# Patient Record
Sex: FEMALE | Race: WHITE | Employment: FULL TIME | URBAN - METROPOLITAN AREA
[De-identification: names, ages, dates, MRNs, and addresses within clinical notes are randomized per-mention and may not be internally consistent; named-entity substitution may affect disease eponyms.]

---

## 2017-03-27 RX ORDER — VENLAFAXINE HYDROCHLORIDE 150 MG/1
CAPSULE, EXTENDED RELEASE ORAL
Qty: 90 CAPSULE | Refills: 0 | Status: SHIPPED | OUTPATIENT
Start: 2017-03-27 | End: 2017-04-06

## 2017-03-27 RX ORDER — VENLAFAXINE HYDROCHLORIDE 150 MG/1
CAPSULE, EXTENDED RELEASE ORAL
Qty: 90 CAPSULE | Refills: 0 | OUTPATIENT
Start: 2017-03-27

## 2017-03-30 ENCOUNTER — LAB ENCOUNTER (OUTPATIENT)
Dept: LAB | Age: 37
End: 2017-03-30
Attending: FAMILY MEDICINE
Payer: COMMERCIAL

## 2017-03-30 DIAGNOSIS — Z00.00 LABORATORY TESTS ORDERED AS PART OF A COMPLETE PHYSICAL EXAM (CPE): ICD-10-CM

## 2017-03-30 PROCEDURE — 81001 URINALYSIS AUTO W/SCOPE: CPT

## 2017-03-30 PROCEDURE — 80053 COMPREHEN METABOLIC PANEL: CPT

## 2017-03-30 PROCEDURE — 85025 COMPLETE CBC W/AUTO DIFF WBC: CPT

## 2017-03-30 PROCEDURE — 80061 LIPID PANEL: CPT

## 2017-03-30 PROCEDURE — 84443 ASSAY THYROID STIM HORMONE: CPT

## 2017-03-30 PROCEDURE — 36415 COLL VENOUS BLD VENIPUNCTURE: CPT

## 2017-04-06 ENCOUNTER — OFFICE VISIT (OUTPATIENT)
Dept: FAMILY MEDICINE CLINIC | Facility: CLINIC | Age: 37
End: 2017-04-06

## 2017-04-06 VITALS
DIASTOLIC BLOOD PRESSURE: 70 MMHG | HEIGHT: 63.5 IN | RESPIRATION RATE: 16 BRPM | SYSTOLIC BLOOD PRESSURE: 122 MMHG | BODY MASS INDEX: 24.46 KG/M2 | OXYGEN SATURATION: 99 % | WEIGHT: 139.75 LBS | TEMPERATURE: 98 F | HEART RATE: 86 BPM

## 2017-04-06 DIAGNOSIS — Z00.00 PHYSICAL EXAM, ANNUAL: Primary | ICD-10-CM

## 2017-04-06 PROCEDURE — 99395 PREV VISIT EST AGE 18-39: CPT | Performed by: FAMILY MEDICINE

## 2017-04-06 PROCEDURE — 88175 CYTOPATH C/V AUTO FLUID REDO: CPT | Performed by: FAMILY MEDICINE

## 2017-04-06 PROCEDURE — 87624 HPV HI-RISK TYP POOLED RSLT: CPT | Performed by: FAMILY MEDICINE

## 2017-04-06 RX ORDER — LEVONORGESTREL AND ETHINYL ESTRADIOL 0.1-0.02MG
1 KIT ORAL DAILY
Qty: 30 TABLET | Refills: 3 | Status: SHIPPED | OUTPATIENT
Start: 2017-04-06 | End: 2017-08-30

## 2017-04-06 NOTE — PATIENT INSTRUCTIONS
Healthy diet. Stay active. Start new birth control pills with start of the new pack. Call in 3 months with update. We can continue new birth control pills or we can switch it back to the current birth control pills if the new one would not work.

## 2017-04-06 NOTE — PROGRESS NOTES
HPI:   Mumtaz Richardson is a 39year old female who presents for a complete physical exam. Symptoms: denies discharge, itching, burning or dysuria. Patient complains of  irregular menses.  Sometimes  light sometimes normal.  Willing to try medication changed to pertinent past surgical history.    Family History   Problem Relation Age of Onset   • anxiety disorder[other] [OTHER] Mother    • hyperthyroidism[other] Jenifer Porch Mother    • Cancer Father      prostate ca   • Cancer Maternal Grandmother      colon ca, to laura no cyanosis, clubbing or edema  NEURO: Oriented times three,cranial nerves are intact,motor and sensory are grossly intact    Results for orders placed or performed in visit on 40[birth year]  -COMP METABOLIC PANEL (14)   Result Value Ref Range   Glucose 96 70-9 81.0-100.0 fL   MCH 28.6 27.0-33.2 pg   MCHC 32.8 31.0-37.0 g/dL   RDW 12.6 11.5-16.0 %   RDW-SD 40.1 35.1-46.3 fL   Neutrophil Absolute Prelim 2.40 1.30-6.70 x10 (3) uL   Neutrophil Absolute 2.40 1.30-6.70 x10(3) uL   Lymphocyte Absolute 2.19 0.90-4.00 x1

## 2017-06-26 RX ORDER — VENLAFAXINE HYDROCHLORIDE 150 MG/1
CAPSULE, EXTENDED RELEASE ORAL
Qty: 90 CAPSULE | Refills: 0 | Status: SHIPPED | OUTPATIENT
Start: 2017-06-26 | End: 2017-09-19

## 2017-08-25 RX ORDER — LEVONORGESTREL AND ETHINYL ESTRADIOL 0.1-0.02MG
1 KIT ORAL DAILY
Qty: 30 TABLET | Refills: 3 | Status: CANCELLED
Start: 2017-08-25

## 2017-08-31 RX ORDER — LEVONORGESTREL AND ETHINYL ESTRADIOL 0.1-0.02MG
1 KIT ORAL DAILY
Qty: 84 TABLET | Refills: 0 | Status: SHIPPED | OUTPATIENT
Start: 2017-08-31 | End: 2017-12-08

## 2017-09-21 RX ORDER — VENLAFAXINE HYDROCHLORIDE 150 MG/1
CAPSULE, EXTENDED RELEASE ORAL
Qty: 30 CAPSULE | Refills: 0 | Status: SHIPPED | OUTPATIENT
Start: 2017-09-21 | End: 2017-10-18

## 2017-10-20 RX ORDER — VENLAFAXINE HYDROCHLORIDE 150 MG/1
CAPSULE, EXTENDED RELEASE ORAL
Qty: 30 CAPSULE | Refills: 0 | Status: SHIPPED | OUTPATIENT
Start: 2017-10-20 | End: 2017-11-14

## 2017-11-15 RX ORDER — VENLAFAXINE HYDROCHLORIDE 150 MG/1
CAPSULE, EXTENDED RELEASE ORAL
Qty: 30 CAPSULE | Refills: 0 | Status: SHIPPED | OUTPATIENT
Start: 2017-11-15 | End: 2017-11-21

## 2017-11-15 NOTE — TELEPHONE ENCOUNTER
VENLAFAXINE ER 150MG CAPSULES    Next  appt is on 11/21/2017. Not a per protocol medication.     Rx is pending for your approval.    Please sign,    Thank you

## 2017-11-21 NOTE — PROGRESS NOTES
Cindy Fairchild is a 40year old female. cc depression, anxiety  HPI:   Patient is coming for follow-up of depression anxiety. She is taking venlafaxine doing well medication and helps her to stay stable.   No suicidal no homicidal.  No side effects of the med diagnosis)    No orders of the defined types were placed in this encounter. Meds & Refills for this Visit:  No prescriptions requested or ordered in this encounter  Continue current meds. Healthy diet. Stay active. .   Imaging & Consults:  None    T

## 2017-12-08 RX ORDER — LEVONORGESTREL AND ETHINYL ESTRADIOL 0.1-0.02MG
1 KIT ORAL DAILY
Qty: 84 TABLET | Refills: 0
Start: 2017-12-08 | End: 2017-12-12

## 2017-12-08 RX ORDER — VENLAFAXINE HYDROCHLORIDE 150 MG/1
150 CAPSULE, EXTENDED RELEASE ORAL
Qty: 90 CAPSULE | Refills: 0
Start: 2017-12-08 | End: 2017-12-12

## 2017-12-08 NOTE — TELEPHONE ENCOUNTER
From: Susana Jaramillo  Sent: 12/8/2017 8:53 AM CST  Subject: Medication Renewal Request    Hemalatha Sandoval.  Dimitri Dance would like a refill of the following medications:     VENLAFAXINE HCL  MG Oral Capsule SR 24 Hr Arie Briones MD]   Patient Comment: I have a

## 2018-06-07 RX ORDER — VENLAFAXINE HYDROCHLORIDE 150 MG/1
CAPSULE, EXTENDED RELEASE ORAL
Qty: 30 CAPSULE | Refills: 0 | Status: SHIPPED | OUTPATIENT
Start: 2018-06-07 | End: 2018-07-05

## 2018-06-07 NOTE — TELEPHONE ENCOUNTER
VENLAFAXINE ER 150MG CAPSULES    Not a per protocol medication. Rx is pending for your approval.    Please print & sign,    Thank you    A [employer] message was sent to  The pt advising her to call and schedule a med check.

## 2018-07-06 RX ORDER — VENLAFAXINE HYDROCHLORIDE 150 MG/1
CAPSULE, EXTENDED RELEASE ORAL
Qty: 30 CAPSULE | Refills: 0 | Status: SHIPPED | OUTPATIENT
Start: 2018-07-06 | End: 2018-08-02

## 2018-07-06 NOTE — TELEPHONE ENCOUNTER
VENLAFAXINE ER 150MG CAPSULES    A [employer] message was sent to the pt regarding scheduling   A med check. She was last seen on 11/21/2017.     Rx is pending for your approval.    Please print & sign,    Thank you

## 2018-08-01 RX ORDER — LEVONORGESTREL AND ETHINYL ESTRADIOL 0.1-0.02MG
1 KIT ORAL DAILY
Qty: 84 TABLET | Refills: 0 | Status: SHIPPED | OUTPATIENT
Start: 2018-08-01 | End: 2018-09-06

## 2018-08-01 RX ORDER — LEVONORGESTREL AND ETHINYL ESTRADIOL 0.1-0.02MG
1 KIT ORAL DAILY
Qty: 84 TABLET | Refills: 0 | OUTPATIENT
Start: 2018-08-01

## 2018-08-02 RX ORDER — VENLAFAXINE HYDROCHLORIDE 150 MG/1
CAPSULE, EXTENDED RELEASE ORAL
Qty: 30 CAPSULE | Refills: 0 | Status: SHIPPED | OUTPATIENT
Start: 2018-08-02 | End: 2018-09-06

## 2018-08-02 RX ORDER — LEVONORGESTREL AND ETHINYL ESTRADIOL 0.1-0.02MG
1 KIT ORAL DAILY
Qty: 84 TABLET | Refills: 0 | OUTPATIENT
Start: 2018-08-02

## 2018-08-02 NOTE — TELEPHONE ENCOUNTER
VENLAFAXINE ER 150MG CAPSULES    Not a per protocol medication. Rx is pending for your approval.    Please sign,     Thank you    The patient has an appt on 09/06/2018    Pt last refill was on 07/06/2018 for 30/0.

## 2018-09-06 ENCOUNTER — OFFICE VISIT (OUTPATIENT)
Dept: FAMILY MEDICINE CLINIC | Facility: CLINIC | Age: 38
End: 2018-09-06
Payer: COMMERCIAL

## 2018-09-06 VITALS
SYSTOLIC BLOOD PRESSURE: 116 MMHG | TEMPERATURE: 98 F | BODY MASS INDEX: 23.97 KG/M2 | WEIGHT: 137 LBS | HEIGHT: 63.5 IN | RESPIRATION RATE: 16 BRPM | DIASTOLIC BLOOD PRESSURE: 44 MMHG | HEART RATE: 74 BPM

## 2018-09-06 DIAGNOSIS — Z13.89 SCREENING FOR GENITOURINARY CONDITION: ICD-10-CM

## 2018-09-06 DIAGNOSIS — R10.9 ABDOMINAL BLOATING WITH CRAMPS: ICD-10-CM

## 2018-09-06 DIAGNOSIS — R14.0 ABDOMINAL BLOATING WITH CRAMPS: ICD-10-CM

## 2018-09-06 DIAGNOSIS — Z12.4 SCREENING FOR MALIGNANT NEOPLASM OF CERVIX: ICD-10-CM

## 2018-09-06 DIAGNOSIS — Z00.00 PHYSICAL EXAM, ANNUAL: Primary | ICD-10-CM

## 2018-09-06 DIAGNOSIS — Z00.00 LABORATORY EXAMINATION ORDERED AS PART OF A ROUTINE GENERAL MEDICAL EXAMINATION: ICD-10-CM

## 2018-09-06 PROCEDURE — 88175 CYTOPATH C/V AUTO FLUID REDO: CPT | Performed by: FAMILY MEDICINE

## 2018-09-06 PROCEDURE — 99395 PREV VISIT EST AGE 18-39: CPT | Performed by: FAMILY MEDICINE

## 2018-09-06 RX ORDER — VENLAFAXINE HYDROCHLORIDE 150 MG/1
150 CAPSULE, EXTENDED RELEASE ORAL
Qty: 90 CAPSULE | Refills: 1 | Status: SHIPPED | OUTPATIENT
Start: 2018-09-06 | End: 2019-02-19

## 2018-09-06 RX ORDER — LEVONORGESTREL AND ETHINYL ESTRADIOL 0.1-0.02MG
1 KIT ORAL DAILY
Qty: 84 TABLET | Refills: 3 | Status: SHIPPED | OUTPATIENT
Start: 2018-09-06 | End: 2019-10-24

## 2018-09-06 NOTE — PROGRESS NOTES
HPI:   Aruna Archibald is a 40year old female who presents for a complete physical exam. Symptoms: denies discharge, itching, burning or dysuria. Patient complains of having abdominal bloating and cramps.   There is family history of colon cancer in her grand state, unspecified    • Depression       No past surgical history on file.    Family History   Problem Relation Age of Onset   • anxiety disorder[other] [OTHER] Mother    • hyperthyroidism[other] Jhon Herb Mother    • Cancer Father      prostate ca   • Cancer adnexal masses or tenderness, PAP was done   RECTAL:normal.  MUSCULOSKELETAL: back is not tender,FROM of the back  EXTREMITIES: no cyanosis, clubbing or edema  NEURO: Oriented times three,cranial nerves are intact,motor and sensory are grossly intact    Re recommended to minimize false   negative results.  [FORMATTING REMOVED]      ASSESSMENT AND PLAN:   Aruna Archibald is a 40year old female who presents for a complete physical exam.   Physical exam, annual  (primary encounter diagnosis)  Laboratory examinatio

## 2019-02-19 NOTE — PROGRESS NOTES
Cody Sanchez is a 45year old female. c anxiety/depression   HPI:   Patient is coming to the office for follow-up of anxiety/depression. She is taking Effexor 150 mg for very long time. She notes recently that this medication makes her too numb.   She does Ht 63.5\"   Wt 139 lb   BMI 24.24 kg/m²   GENERAL: well developed, well nourished,in no apparent distress  SKIN: no rashes,no suspicious lesions  HEENT: atraumatic, normocephalic,ears and throat are clear  NECK: supple,no adenopathy  LUNGS: clear to auscu

## 2019-02-19 NOTE — PATIENT INSTRUCTIONS
Stop venlafaxine 150 mg daily. Start venlafaxine 75 mg daily. Keep good hydration. Healthy diet. Regular exercise.

## 2019-03-12 RX ORDER — VENLAFAXINE HYDROCHLORIDE 150 MG/1
CAPSULE, EXTENDED RELEASE ORAL
Qty: 90 CAPSULE | Refills: 0 | OUTPATIENT
Start: 2019-03-12

## 2019-05-01 ENCOUNTER — PATIENT MESSAGE (OUTPATIENT)
Dept: FAMILY MEDICINE CLINIC | Facility: CLINIC | Age: 39
End: 2019-05-01

## 2019-05-02 RX ORDER — VENLAFAXINE HYDROCHLORIDE 150 MG/1
150 CAPSULE, EXTENDED RELEASE ORAL
Qty: 90 CAPSULE | Refills: 0 | Status: SHIPPED | OUTPATIENT
Start: 2019-05-02 | End: 2020-06-10 | Stop reason: ALTCHOICE

## 2019-05-02 NOTE — TELEPHONE ENCOUNTER
From: Ariane Pereyra  To: Davina Kumar MD  Sent: 5/1/2019 6:05 PM CDT  Subject: Prescription Question    Hi Dr. Chrissie Vargas  Last appointment I talked to you about reducing my medicine from 150 mg to 75mg.  I did not like the way it made me feel at all

## 2019-05-22 RX ORDER — VENLAFAXINE HYDROCHLORIDE 75 MG/1
CAPSULE, EXTENDED RELEASE ORAL
Qty: 90 CAPSULE | Refills: 0 | Status: SHIPPED | OUTPATIENT
Start: 2019-05-22 | End: 2020-06-10 | Stop reason: ALTCHOICE

## 2019-05-22 NOTE — TELEPHONE ENCOUNTER
Last OV : 2/19/2019 Med f/u  Upcoming appt/reason:    Future Appointments   Date Time Provider Salome Christensen   6/13/2019  3:20 PM Mirtha Collins MD [facility]     Venlafaxine HCl ER 75 MG Oral Capsule SR 24 Hr 90 capsule 0 2/19/2019     Last

## 2019-06-13 PROBLEM — K92.89 GAS BLOAT SYNDROME: Status: ACTIVE | Noted: 2019-06-13

## 2019-06-13 PROBLEM — Z80.0 FAMILY HISTORY OF MALIGNANT NEOPLASM OF COLON: Status: ACTIVE | Noted: 2019-06-13

## 2019-06-13 PROBLEM — Z83.71 FAMILY HISTORY OF POLYPS IN THE COLON: Status: ACTIVE | Noted: 2019-06-13

## 2019-06-13 PROBLEM — Z83.719 FAMILY HISTORY OF POLYPS IN THE COLON: Status: ACTIVE | Noted: 2019-06-13

## 2019-10-24 RX ORDER — LEVONORGESTREL AND ETHINYL ESTRADIOL 0.1-0.02MG
KIT ORAL
Qty: 84 TABLET | Refills: 3 | Status: SHIPPED | OUTPATIENT
Start: 2019-10-24 | End: 2020-06-10

## 2020-01-23 PROBLEM — Z80.0 FAMILY HISTORY OF MALIGNANT NEOPLASM OF DIGESTIVE ORGAN: Status: ACTIVE | Noted: 2020-01-23

## 2020-01-23 PROBLEM — Z83.71 FAMILY HISTORY OF COLONIC POLYPS: Status: ACTIVE | Noted: 2020-01-23

## 2020-01-23 PROBLEM — Z12.11 SPECIAL SCREENING FOR MALIGNANT NEOPLASM OF COLON: Status: ACTIVE | Noted: 2020-01-23

## 2020-01-23 PROBLEM — Z83.719 FAMILY HISTORY OF COLONIC POLYPS: Status: ACTIVE | Noted: 2020-01-23

## 2020-04-13 ENCOUNTER — VIRTUAL PHONE E/M (OUTPATIENT)
Dept: FAMILY MEDICINE CLINIC | Facility: CLINIC | Age: 40
End: 2020-04-13
Payer: COMMERCIAL

## 2020-04-13 DIAGNOSIS — R07.89 CHEST TIGHTNESS: ICD-10-CM

## 2020-04-13 DIAGNOSIS — J30.81 ALLERGY TO CATS: Primary | ICD-10-CM

## 2020-04-13 PROCEDURE — 99213 OFFICE O/P EST LOW 20 MIN: CPT | Performed by: FAMILY MEDICINE

## 2020-04-13 RX ORDER — ALBUTEROL SULFATE 90 UG/1
2 AEROSOL, METERED RESPIRATORY (INHALATION) EVERY 4 HOURS PRN
Qty: 1 INHALER | Refills: 0 | Status: SHIPPED | OUTPATIENT
Start: 2020-04-13 | End: 2020-04-26

## 2020-04-13 NOTE — PATIENT INSTRUCTIONS
Try albuterol inhaler 2 puffs every 4-6 hours as needed for chest tightness. Flonase 2 sprays nostril once a day daily. Try Allegra 180 mg 1 tablet daily this is antihistamine.   If there is no improvement in your symptoms consider seeing allergist.

## 2020-04-13 NOTE — PROGRESS NOTES
Virtual/Telephone Check-In    Mana Rodrigues verbally consents to a [employer] on 04/13/20. Patient understands and accepts financial responsibility for any deductible, co-insurance and/or co-pays associated with this service.  This vi OU BID  2     Patient Active Problem List:     Anxiety     Depressive disorder     Family history of malignant neoplasm of colon     Family history of polyps in the colon     Gas bloat syndrome     Special screening for malignant neoplasm of colon     Fami

## 2020-04-26 DIAGNOSIS — R07.89 CHEST TIGHTNESS: ICD-10-CM

## 2020-04-26 RX ORDER — ALBUTEROL SULFATE 90 UG/1
AEROSOL, METERED RESPIRATORY (INHALATION)
Qty: 8.5 G | Refills: 0 | Status: SHIPPED | OUTPATIENT
Start: 2020-04-26 | End: 2021-05-17

## 2020-06-10 ENCOUNTER — OFFICE VISIT (OUTPATIENT)
Dept: FAMILY MEDICINE CLINIC | Facility: CLINIC | Age: 40
End: 2020-06-10
Payer: COMMERCIAL

## 2020-06-10 VITALS
WEIGHT: 140 LBS | TEMPERATURE: 99 F | RESPIRATION RATE: 16 BRPM | SYSTOLIC BLOOD PRESSURE: 102 MMHG | HEART RATE: 74 BPM | DIASTOLIC BLOOD PRESSURE: 72 MMHG | BODY MASS INDEX: 24.5 KG/M2 | HEIGHT: 63.5 IN | OXYGEN SATURATION: 98 %

## 2020-06-10 DIAGNOSIS — Z12.4 SCREENING FOR MALIGNANT NEOPLASM OF CERVIX: ICD-10-CM

## 2020-06-10 DIAGNOSIS — Z00.00 PHYSICAL EXAM, ANNUAL: Primary | ICD-10-CM

## 2020-06-10 DIAGNOSIS — Z13.89 SCREENING FOR GENITOURINARY CONDITION: ICD-10-CM

## 2020-06-10 DIAGNOSIS — Z12.31 ENCOUNTER FOR SCREENING MAMMOGRAM FOR MALIGNANT NEOPLASM OF BREAST: ICD-10-CM

## 2020-06-10 DIAGNOSIS — Z00.00 LABORATORY EXAMINATION ORDERED AS PART OF A ROUTINE GENERAL MEDICAL EXAMINATION: ICD-10-CM

## 2020-06-10 PROCEDURE — 99395 PREV VISIT EST AGE 18-39: CPT | Performed by: FAMILY MEDICINE

## 2020-06-10 PROCEDURE — 88175 CYTOPATH C/V AUTO FLUID REDO: CPT | Performed by: FAMILY MEDICINE

## 2020-06-10 PROCEDURE — 87624 HPV HI-RISK TYP POOLED RSLT: CPT | Performed by: FAMILY MEDICINE

## 2020-06-10 RX ORDER — LEVONORGESTREL AND ETHINYL ESTRADIOL 0.1-0.02MG
1 KIT ORAL DAILY
Qty: 84 TABLET | Refills: 3 | Status: SHIPPED | OUTPATIENT
Start: 2020-06-10 | End: 2021-03-04

## 2020-06-10 NOTE — PROGRESS NOTES
HPI:   Ariane Pereyra is a 44year old female who presents for a complete physical exam. Symptoms: denies discharge, itching, burning or dysuria. Patient has no complains.       Immunization History  Administered            Date(s) Administered    Influenza state, unspecified    • Bloating    • Depression    • Diarrhea, unspecified    • Wears glasses       History reviewed. No pertinent surgical history.    Family History   Problem Relation Age of Onset   • Colon Polyps Mother    • Other (anxiety disorder) Mot throat are clear  EYES:PERRLA, EOMI, ,conjunctiva are clear  NECK: supple,no adenopathy  CHEST: no chest tenderness  BREAST: no dominant or suspicious mass  LUNGS: clear to auscultation  CARDIO: RRR without murmur  GI: good BS's,no masses, HSM or tendernes plan.  The patient is asked to return for CPX in 1 year.

## 2020-06-18 ENCOUNTER — LAB ENCOUNTER (OUTPATIENT)
Dept: LAB | Age: 40
End: 2020-06-18
Attending: FAMILY MEDICINE
Payer: COMMERCIAL

## 2020-06-18 DIAGNOSIS — Z13.89 SCREENING FOR GENITOURINARY CONDITION: ICD-10-CM

## 2020-06-18 DIAGNOSIS — Z00.00 LABORATORY EXAMINATION ORDERED AS PART OF A ROUTINE GENERAL MEDICAL EXAMINATION: ICD-10-CM

## 2020-06-18 PROCEDURE — 84443 ASSAY THYROID STIM HORMONE: CPT

## 2020-06-18 PROCEDURE — 85025 COMPLETE CBC W/AUTO DIFF WBC: CPT

## 2020-06-18 PROCEDURE — 80053 COMPREHEN METABOLIC PANEL: CPT

## 2020-06-18 PROCEDURE — 36415 COLL VENOUS BLD VENIPUNCTURE: CPT

## 2020-06-18 PROCEDURE — 81003 URINALYSIS AUTO W/O SCOPE: CPT

## 2020-06-18 PROCEDURE — 80061 LIPID PANEL: CPT

## 2020-11-12 ENCOUNTER — HOSPITAL ENCOUNTER (OUTPATIENT)
Dept: MAMMOGRAPHY | Age: 40
Discharge: HOME | End: 2020-11-12
Attending: FAMILY MEDICINE
Payer: COMMERCIAL

## 2020-11-12 DIAGNOSIS — Z12.31 ENCOUNTER FOR SCREENING MAMMOGRAM FOR MALIGNANT NEOPLASM OF BREAST: ICD-10-CM

## 2020-11-12 PROCEDURE — 77063 BREAST TOMOSYNTHESIS BI: CPT | Performed by: FAMILY MEDICINE

## 2020-11-12 PROCEDURE — 77067 SCR MAMMO BI INCL CAD: CPT | Performed by: FAMILY MEDICINE

## 2021-03-05 RX ORDER — LEVONORGESTREL AND ETHINYL ESTRADIOL 0.1-0.02MG
1 KIT ORAL DAILY
Qty: 84 TABLET | Refills: 0 | Status: SHIPPED | OUTPATIENT
Start: 2021-03-05 | End: 2021-05-17

## 2021-05-17 ENCOUNTER — OFFICE VISIT (OUTPATIENT)
Dept: FAMILY MEDICINE CLINIC | Facility: CLINIC | Age: 41
End: 2021-05-17

## 2021-05-17 ENCOUNTER — LAB ENCOUNTER (OUTPATIENT)
Dept: LAB | Age: 41
End: 2021-05-17
Attending: FAMILY MEDICINE
Payer: COMMERCIAL

## 2021-05-17 VITALS
DIASTOLIC BLOOD PRESSURE: 80 MMHG | BODY MASS INDEX: 23.62 KG/M2 | HEIGHT: 63.5 IN | RESPIRATION RATE: 16 BRPM | HEART RATE: 78 BPM | WEIGHT: 135 LBS | SYSTOLIC BLOOD PRESSURE: 130 MMHG

## 2021-05-17 DIAGNOSIS — R79.89 ABNORMAL CBC: ICD-10-CM

## 2021-05-17 DIAGNOSIS — Z12.31 ENCOUNTER FOR SCREENING MAMMOGRAM FOR BREAST CANCER: ICD-10-CM

## 2021-05-17 DIAGNOSIS — Z12.4 SCREENING FOR CERVICAL CANCER: ICD-10-CM

## 2021-05-17 DIAGNOSIS — Z13.89 SCREENING FOR GENITOURINARY CONDITION: ICD-10-CM

## 2021-05-17 DIAGNOSIS — Z00.00 LABORATORY EXAMINATION ORDERED AS PART OF A ROUTINE GENERAL MEDICAL EXAMINATION: ICD-10-CM

## 2021-05-17 DIAGNOSIS — M79.671 RIGHT FOOT PAIN: ICD-10-CM

## 2021-05-17 DIAGNOSIS — Z00.00 PHYSICAL EXAM, ANNUAL: Primary | ICD-10-CM

## 2021-05-17 PROCEDURE — 3075F SYST BP GE 130 - 139MM HG: CPT | Performed by: FAMILY MEDICINE

## 2021-05-17 PROCEDURE — 88175 CYTOPATH C/V AUTO FLUID REDO: CPT | Performed by: FAMILY MEDICINE

## 2021-05-17 PROCEDURE — 87624 HPV HI-RISK TYP POOLED RSLT: CPT | Performed by: FAMILY MEDICINE

## 2021-05-17 PROCEDURE — 3008F BODY MASS INDEX DOCD: CPT | Performed by: FAMILY MEDICINE

## 2021-05-17 PROCEDURE — 84443 ASSAY THYROID STIM HORMONE: CPT

## 2021-05-17 PROCEDURE — 85025 COMPLETE CBC W/AUTO DIFF WBC: CPT

## 2021-05-17 PROCEDURE — G0438 PPPS, INITIAL VISIT: HCPCS | Performed by: FAMILY MEDICINE

## 2021-05-17 PROCEDURE — 3079F DIAST BP 80-89 MM HG: CPT | Performed by: FAMILY MEDICINE

## 2021-05-17 PROCEDURE — 80053 COMPREHEN METABOLIC PANEL: CPT

## 2021-05-17 PROCEDURE — 80061 LIPID PANEL: CPT

## 2021-05-17 PROCEDURE — 81001 URINALYSIS AUTO W/SCOPE: CPT

## 2021-05-17 PROCEDURE — 99396 PREV VISIT EST AGE 40-64: CPT | Performed by: FAMILY MEDICINE

## 2021-05-17 PROCEDURE — 36415 COLL VENOUS BLD VENIPUNCTURE: CPT

## 2021-05-17 RX ORDER — ARIPIPRAZOLE 2 MG/1
2 TABLET ORAL DAILY
COMMUNITY

## 2021-05-17 RX ORDER — LEVONORGESTREL AND ETHINYL ESTRADIOL 0.1-0.02MG
1 KIT ORAL DAILY
Qty: 84 TABLET | Refills: 3 | Status: SHIPPED | OUTPATIENT
Start: 2021-05-17

## 2021-05-17 RX ORDER — DULOXETIN HYDROCHLORIDE 60 MG/1
60 CAPSULE, DELAYED RELEASE ORAL DAILY
COMMUNITY
Start: 2021-05-04

## 2021-05-17 NOTE — PROGRESS NOTES
HPI:   Sinai Dumont is a 36year old female who presents for a complete physical exam. Symptoms: denies discharge, itching, burning or dysuria. Patient has been complaining of having pain in the right foot. Had injury of the right foot. Sustained a fall. Oral Cap DR Particles 60 mg daily. • ARIPiprazole 2 MG Oral Tab Take 2 mg by mouth daily. • Levonorgestrel-Ethinyl Estrad (VIENVA) 0.1-20 MG-MCG Oral Tab Take 1 tablet by mouth daily.  84 tablet 3      Past Medical History:   Diagnosis Date   • Julio Ht 5' 3.5\" (1.613 m)   Wt 135 lb (61.2 kg)   LMP 05/10/2021 (Exact Date)   BMI 23.54 kg/m²   Body mass index is 23.54 kg/m².    GENERAL: well developed, well nourished,in no apparent distress  SKIN: no rashes,no suspicious lesions  HEENT: atraumatic, nor Pap and pelvic done. Order put in for mammogram. Self breast exam explained. Health maintenance, will check fasting Lipids, CMP, and CBC. Pt will be at 48  referred for screening colonoscopy. Pt' s weight is Body mass index is 23.54 kg/m². , recommended low

## 2021-05-17 NOTE — PATIENT INSTRUCTIONS
Healthy diet. Stay active. Call [phone] to schedule Mammogram in November. Return for tetanus shot June/ July of this year.-Schedule appointment with the nurse.

## 2021-09-03 ENCOUNTER — TELEPHONE (OUTPATIENT)
Dept: ORTHOPEDICS CLINIC | Facility: CLINIC | Age: 41
End: 2021-09-03

## 2021-09-03 DIAGNOSIS — M79.671 RIGHT FOOT PAIN: Primary | ICD-10-CM

## 2021-09-03 NOTE — TELEPHONE ENCOUNTER
Requisite for xray order  Reviewed patients chart, xray orders are required. Order placed for right foot xrays    • Spoke to patient informed to arrive 30 mins prior to patients appt to complete x-ray order. Patient verbalized understanding and agreement.

## 2021-09-28 ENCOUNTER — OFFICE VISIT (OUTPATIENT)
Dept: ORTHOPEDICS CLINIC | Facility: CLINIC | Age: 41
End: 2021-09-28
Payer: COMMERCIAL

## 2021-09-28 ENCOUNTER — HOSPITAL ENCOUNTER (OUTPATIENT)
Dept: GENERAL RADIOLOGY | Age: 41
Discharge: HOME | End: 2021-09-28
Attending: PODIATRIST
Payer: COMMERCIAL

## 2021-09-28 VITALS — OXYGEN SATURATION: 99 % | HEART RATE: 72 BPM

## 2021-09-28 DIAGNOSIS — M79.671 RIGHT FOOT PAIN: ICD-10-CM

## 2021-09-28 DIAGNOSIS — M21.619 BUNION: ICD-10-CM

## 2021-09-28 DIAGNOSIS — M25.571 SINUS TARSI SYNDROME OF RIGHT FOOT: ICD-10-CM

## 2021-09-28 DIAGNOSIS — T14.8XXA AVULSION FRACTURE: Primary | ICD-10-CM

## 2021-09-28 PROCEDURE — 99203 OFFICE O/P NEW LOW 30 MIN: CPT | Performed by: PODIATRIST

## 2021-09-28 PROCEDURE — 73630 X-RAY EXAM OF FOOT: CPT | Performed by: PODIATRIST

## 2021-09-28 PROCEDURE — 96372 THER/PROPH/DIAG INJ SC/IM: CPT | Performed by: PODIATRIST

## 2021-09-28 RX ORDER — BETAMETHASONE SODIUM PHOSPHATE AND BETAMETHASONE ACETATE 3; 3 MG/ML; MG/ML
2.5 INJECTION, SUSPENSION INTRA-ARTICULAR; INTRALESIONAL; INTRAMUSCULAR; SOFT TISSUE ONCE
Status: COMPLETED | OUTPATIENT
Start: 2021-09-28 | End: 2021-09-28

## 2021-09-28 RX ADMIN — BETAMETHASONE SODIUM PHOSPHATE AND BETAMETHASONE ACETATE 2.4 MG: 3; 3 INJECTION, SUSPENSION INTRA-ARTICULAR; INTRALESIONAL; INTRAMUSCULAR; SOFT TISSUE at 09:28:00

## 2021-09-28 NOTE — PROGRESS NOTES
[facility] New Patient Note    CC: Patient presents with:   Foot Pain: right foot pain , discuss injection       HPI: The patient is a 36year old female who presents today with complaints of outside of right foot pain with certain activity    R Pulse 72   LMP 05/10/2021 (Exact Date)   SpO2 99%   neurovascular status is intact distally. Right foot-tender in the sinus tarsi region and anterior beak of the calcaneus. No swelling is present.   Mild irritation with range of motion of the subtalar j

## 2021-12-31 ENCOUNTER — PATIENT MESSAGE (OUTPATIENT)
Dept: FAMILY MEDICINE CLINIC | Facility: CLINIC | Age: 41
End: 2021-12-31

## 2022-01-03 NOTE — TELEPHONE ENCOUNTER
Pt requesting referral be placed for [facility] for Dr. Erwin Franco at [facility]. Pt states has not discussed this with Dr Jennfier Khalil yet, but has upcoming apt on 1/6/22 at 9:00 AM and needs referral asap.     Pt will  referral does not have fax numb

## 2022-01-03 NOTE — TELEPHONE ENCOUNTER
Call to pt-sts she has appt 1/6/21 w bettie dixon/therapist for anxiety. Has not discussed w dr Zaid Calderon. Advised dr Zaid Calderon is out of ofc-may be back 1/5. Will route for her review and call back w her input.      Pt sts \"want referral done soon

## 2022-01-04 NOTE — TELEPHONE ENCOUNTER
[phone] Call from pt-advised of claudine hansen to place referral order. Advised will need input from [facility] referrals to place order. Will call back asap re completion of referral order.  Patient voices understanding/agrees with plan/no further questions

## 2022-01-05 ENCOUNTER — PATIENT MESSAGE (OUTPATIENT)
Dept: FAMILY MEDICINE CLINIC | Facility: CLINIC | Age: 42
End: 2022-01-05

## 2022-01-05 NOTE — TELEPHONE ENCOUNTER
I have signed referral for patient I change it to psychologist since Alpa Hamm is a therapist.  Please check with Tiffany Hutson. I am not sure if she needs even referral from us since she has [facility]. Thanks.

## 2022-01-05 NOTE — TELEPHONE ENCOUNTER
Call to pt's home reaches identified voice mail. Per hipaa consent, left vmm advising dr Sanchez Rodriguez is back in ofc today, signed referral order and also provides reminder to confirm w insurance that [employer] is covered by her plan.  Advised printed

## 2022-01-05 NOTE — TELEPHONE ENCOUNTER
Spoke jhonny ramírez/[facility] referrals-she utilized NPI # for [email or web address] [email or web address] to run it through [facility]. sts this individual does not come up on provider search which means either this provider is not in network or is not in their database.      Huong ramírez pt report

## 2022-01-06 NOTE — TELEPHONE ENCOUNTER
Call to pt-advised we have no capability to place a mental health referral order specifically for a LCSW.  Discussed LCSWs usually work under [employer] of a pshychologist or psychiatrist, which is why we detailed name of provider and her title in the ref

## 2022-01-12 ENCOUNTER — HOSPITAL ENCOUNTER (OUTPATIENT)
Dept: MAMMOGRAPHY | Age: 42
Discharge: HOME | End: 2022-01-12
Attending: FAMILY MEDICINE
Payer: COMMERCIAL

## 2022-01-12 DIAGNOSIS — Z12.31 ENCOUNTER FOR SCREENING MAMMOGRAM FOR BREAST CANCER: ICD-10-CM

## 2022-01-12 PROCEDURE — 77063 BREAST TOMOSYNTHESIS BI: CPT | Performed by: FAMILY MEDICINE

## 2022-01-12 PROCEDURE — 77067 SCR MAMMO BI INCL CAD: CPT | Performed by: FAMILY MEDICINE

## 2022-01-20 ENCOUNTER — PATIENT MESSAGE (OUTPATIENT)
Dept: FAMILY MEDICINE CLINIC | Facility: CLINIC | Age: 42
End: 2022-01-20

## 2022-01-20 DIAGNOSIS — R92.2 DENSE BREAST TISSUE: Primary | ICD-10-CM

## 2022-01-24 NOTE — TELEPHONE ENCOUNTER
From: Edel Franks  To: Chloe Bhat MD  Sent: 1/20/2022 3:38 PM CST  Subject: Mammogram f/up. Hello  I received the attached email. Both of these tests are covered by my insurance as long as at a preferred lab. How do I get this scheduled?

## 2022-02-17 RX ORDER — LEVONORGESTREL AND ETHINYL ESTRADIOL 0.1-0.02MG
KIT ORAL
Qty: 84 TABLET | Refills: 0 | Status: SHIPPED | OUTPATIENT
Start: 2022-02-17

## 2022-02-17 NOTE — TELEPHONE ENCOUNTER
LOV: 5/17/21 (CPX)  Last Refill: 5/17/21, #84, 3 RF  Next OV: N/A    Protocol passed.    [facility] sent to pt to schedule annual.

## 2022-03-04 ENCOUNTER — HOSPITAL ENCOUNTER (OUTPATIENT)
Dept: ULTRASOUND IMAGING | Age: 42
Discharge: HOME | End: 2022-03-04
Attending: FAMILY MEDICINE
Payer: COMMERCIAL

## 2022-03-04 DIAGNOSIS — R92.2 DENSE BREAST TISSUE: ICD-10-CM

## 2022-03-04 PROCEDURE — 76641 ULTRASOUND BREAST COMPLETE: CPT | Performed by: FAMILY MEDICINE

## 2022-06-15 ENCOUNTER — OFFICE VISIT (OUTPATIENT)
Dept: ORTHOPEDICS CLINIC | Facility: CLINIC | Age: 42
End: 2022-06-15
Payer: COMMERCIAL

## 2022-06-15 VITALS — HEART RATE: 84 BPM | OXYGEN SATURATION: 99 %

## 2022-06-15 DIAGNOSIS — M25.571 SINUS TARSI SYNDROME OF RIGHT FOOT: Primary | ICD-10-CM

## 2022-06-15 PROCEDURE — 99213 OFFICE O/P EST LOW 20 MIN: CPT | Performed by: PODIATRIST

## 2022-06-15 PROCEDURE — 20600 DRAIN/INJ JOINT/BURSA W/O US: CPT | Performed by: PODIATRIST

## 2022-06-15 RX ORDER — BETAMETHASONE SODIUM PHOSPHATE AND BETAMETHASONE ACETATE 3; 3 MG/ML; MG/ML
4 INJECTION, SUSPENSION INTRA-ARTICULAR; INTRALESIONAL; INTRAMUSCULAR; SOFT TISSUE ONCE
Status: COMPLETED | OUTPATIENT
Start: 2022-06-15 | End: 2022-06-15

## 2022-06-15 RX ADMIN — BETAMETHASONE SODIUM PHOSPHATE AND BETAMETHASONE ACETATE 4.2 MG: 3; 3 INJECTION, SUSPENSION INTRA-ARTICULAR; INTRALESIONAL; INTRAMUSCULAR; SOFT TISSUE at 05:17:00

## 2022-06-15 NOTE — PROGRESS NOTES
[facility] Progress Note    Subjective:     Yesika Valencia Is here for follow-up of her right foot. She has had 2 injections of the sinus tarsi in the past about 1 year apart and it has helped until recently she started to get pain again. She thinks running aggravates it although she only runs 2 miles about 5 days a week        Objective:     Exam- tenderness about the sinus tarsi no swelling of the right foot noted    No pain with range of motion of the subtalar joint. Review of the x-ray shows the fracture fragment that never healed at the anterior beak of the calcaneus and this is right where she has the pain. Assessment/Plan:     Diagnoses and all orders for this visit:    Sinus tarsi syndrome of right foot  -     betamethasone sodium phosphate & acetate (CELESTONE) 6 (3-3) MG/ML injection 4.2 mg  -     DRAIN/INJECT SMALL JOINT/BURSA        She always could consider excision of that fracture fragment that never healed because that probably is part of the issue and the reason for the sinus tarsi pain. I did recommend stable shoes for running and running on a flat surface. Also consider orthotics. We did go ahead with another injection today. Risks and benefits discussed. Sterile prep of affected right foot . Injection of .5cc of betamethasone phosphate to painful area at sinus tarsi  She tolerated the injection well. Icing and rest of the foot. Follow-up as needed. Clarke Mario. Wesley Macedo DPM  [facility]    [employer]us speech recognition software was used to prepare this note. If a word or phrase is confusing, it is likely do to a failure of recognition. Please contact me with any questions or clarifications.

## 2022-08-03 RX ORDER — LEVONORGESTREL AND ETHINYL ESTRADIOL 0.1-0.02MG
KIT ORAL
Qty: 84 TABLET | Refills: 0 | Status: SHIPPED | OUTPATIENT
Start: 2022-08-03

## 2022-08-03 NOTE — TELEPHONE ENCOUNTER
LOV: 05/17/2021  For: physical exam   Patient advised to RTC on:   The patient is asked to return for CPX in 1 year    NOV:08/17/2022    Medication Quantity Refills Start End   LESSINA 0.1-20 MG-MCG Oral Tab 84 tablet 0 2/17/2022    Sig: Hamm Root 1 TABLET BY MOUTH DAILY       Gynecology Medication Protocol Passed 08/03/2022 06:52 AM    PASS--PENDING LAST PAP WNL--VIA MANUAL LOOKUP    Mammogram in past 12 months    Physical or Pelvic/Breast in past 12 or next 3 mos--VIA MANUAL LOOKUP

## 2022-08-17 ENCOUNTER — OFFICE VISIT (OUTPATIENT)
Dept: FAMILY MEDICINE CLINIC | Facility: CLINIC | Age: 42
End: 2022-08-17
Payer: COMMERCIAL

## 2022-08-17 ENCOUNTER — LAB ENCOUNTER (OUTPATIENT)
Dept: LAB | Age: 42
End: 2022-08-17
Attending: FAMILY MEDICINE
Payer: COMMERCIAL

## 2022-08-17 VITALS
HEART RATE: 74 BPM | RESPIRATION RATE: 14 BRPM | SYSTOLIC BLOOD PRESSURE: 120 MMHG | WEIGHT: 131 LBS | HEIGHT: 63.5 IN | BODY MASS INDEX: 22.92 KG/M2 | TEMPERATURE: 98 F | DIASTOLIC BLOOD PRESSURE: 82 MMHG

## 2022-08-17 DIAGNOSIS — D22.9 MULTIPLE NEVI: ICD-10-CM

## 2022-08-17 DIAGNOSIS — Z00.00 LABORATORY EXAMINATION ORDERED AS PART OF A ROUTINE GENERAL MEDICAL EXAMINATION: ICD-10-CM

## 2022-08-17 DIAGNOSIS — Z12.31 SCREENING MAMMOGRAM FOR BREAST CANCER: ICD-10-CM

## 2022-08-17 DIAGNOSIS — Z23 NEED FOR TDAP VACCINATION: ICD-10-CM

## 2022-08-17 DIAGNOSIS — Z00.00 PHYSICAL EXAM, ANNUAL: ICD-10-CM

## 2022-08-17 DIAGNOSIS — Z00.00 PHYSICAL EXAM, ANNUAL: Primary | ICD-10-CM

## 2022-08-17 DIAGNOSIS — Z13.89 SCREENING FOR GENITOURINARY CONDITION: ICD-10-CM

## 2022-08-17 DIAGNOSIS — Z12.4 ENCOUNTER FOR SCREENING FOR CERVICAL CANCER: ICD-10-CM

## 2022-08-17 LAB
ALBUMIN SERPL-MCNC: 3.7 G/DL (ref 3.4–5)
ALBUMIN/GLOB SERPL: 1 {RATIO} (ref 1–2)
ALP LIVER SERPL-CCNC: 42 U/L
ALT SERPL-CCNC: 26 U/L
ANION GAP SERPL CALC-SCNC: 3 MMOL/L (ref 0–18)
AST SERPL-CCNC: 20 U/L (ref 15–37)
BASOPHILS # BLD AUTO: 0.05 X10(3) UL (ref 0–0.2)
BASOPHILS NFR BLD AUTO: 1.3 %
BILIRUB SERPL-MCNC: 0.3 MG/DL (ref 0.1–2)
BILIRUB UR QL: NEGATIVE
BUN BLD-MCNC: 10 MG/DL (ref 7–18)
BUN/CREAT SERPL: 12.8 (ref 10–20)
CALCIUM BLD-MCNC: 9 MG/DL (ref 8.5–10.1)
CHLORIDE SERPL-SCNC: 107 MMOL/L (ref 98–112)
CHOLEST SERPL-MCNC: 216 MG/DL (ref ?–200)
CLARITY UR: CLEAR
CO2 SERPL-SCNC: 28 MMOL/L (ref 21–32)
COLOR UR: YELLOW
CREAT BLD-MCNC: 0.78 MG/DL
DEPRECATED RDW RBC AUTO: 39.5 FL (ref 35.1–46.3)
EOSINOPHIL # BLD AUTO: 0.03 X10(3) UL (ref 0–0.7)
EOSINOPHIL NFR BLD AUTO: 0.8 %
ERYTHROCYTE [DISTWIDTH] IN BLOOD BY AUTOMATED COUNT: 12.3 % (ref 11–15)
FASTING PATIENT LIPID ANSWER: YES
FASTING STATUS PATIENT QL REPORTED: YES
GFR SERPLBLD BASED ON 1.73 SQ M-ARVRAT: 98 ML/MIN/1.73M2 (ref 60–?)
GLOBULIN PLAS-MCNC: 3.6 G/DL (ref 2.8–4.4)
GLUCOSE BLD-MCNC: 98 MG/DL (ref 70–99)
GLUCOSE UR-MCNC: NEGATIVE MG/DL
HCT VFR BLD AUTO: 41 %
HDLC SERPL-MCNC: 88 MG/DL (ref 40–59)
HGB BLD-MCNC: 13.3 G/DL
IMM GRANULOCYTES # BLD AUTO: 0.01 X10(3) UL (ref 0–1)
IMM GRANULOCYTES NFR BLD: 0.3 %
LDLC SERPL CALC-MCNC: 117 MG/DL (ref ?–100)
LEUKOCYTE ESTERASE UR QL STRIP.AUTO: NEGATIVE
LYMPHOCYTES # BLD AUTO: 1.72 X10(3) UL (ref 1–4)
LYMPHOCYTES NFR BLD AUTO: 44.3 %
MCH RBC QN AUTO: 28.4 PG (ref 26–34)
MCHC RBC AUTO-ENTMCNC: 32.4 G/DL (ref 31–37)
MCV RBC AUTO: 87.4 FL
MONOCYTES # BLD AUTO: 0.39 X10(3) UL (ref 0.1–1)
MONOCYTES NFR BLD AUTO: 10.1 %
NEUTROPHILS # BLD AUTO: 1.68 X10 (3) UL (ref 1.5–7.7)
NEUTROPHILS # BLD AUTO: 1.68 X10(3) UL (ref 1.5–7.7)
NEUTROPHILS NFR BLD AUTO: 43.2 %
NITRITE UR QL STRIP.AUTO: NEGATIVE
NONHDLC SERPL-MCNC: 128 MG/DL (ref ?–130)
OSMOLALITY SERPL CALC.SUM OF ELEC: 285 MOSM/KG (ref 275–295)
PH UR: 7 [PH] (ref 5–8)
PLATELET # BLD AUTO: 277 10(3)UL (ref 150–450)
POTASSIUM SERPL-SCNC: 5 MMOL/L (ref 3.5–5.1)
PROT SERPL-MCNC: 7.3 G/DL (ref 6.4–8.2)
PROT UR-MCNC: NEGATIVE MG/DL
RBC # BLD AUTO: 4.69 X10(6)UL
SODIUM SERPL-SCNC: 138 MMOL/L (ref 136–145)
SP GR UR STRIP: 1.02 (ref 1–1.03)
TRIGL SERPL-MCNC: 60 MG/DL (ref 30–149)
TSI SER-ACNC: 1.86 MIU/ML (ref 0.36–3.74)
UROBILINOGEN UR STRIP-ACNC: 0.2
VLDLC SERPL CALC-MCNC: 10 MG/DL (ref 0–30)
WBC # BLD AUTO: 3.9 X10(3) UL (ref 4–11)

## 2022-08-17 PROCEDURE — 80053 COMPREHEN METABOLIC PANEL: CPT

## 2022-08-17 PROCEDURE — 80061 LIPID PANEL: CPT

## 2022-08-17 PROCEDURE — 3079F DIAST BP 80-89 MM HG: CPT | Performed by: FAMILY MEDICINE

## 2022-08-17 PROCEDURE — 90471 IMMUNIZATION ADMIN: CPT | Performed by: FAMILY MEDICINE

## 2022-08-17 PROCEDURE — 84443 ASSAY THYROID STIM HORMONE: CPT

## 2022-08-17 PROCEDURE — 81001 URINALYSIS AUTO W/SCOPE: CPT

## 2022-08-17 PROCEDURE — 85025 COMPLETE CBC W/AUTO DIFF WBC: CPT

## 2022-08-17 PROCEDURE — 3008F BODY MASS INDEX DOCD: CPT | Performed by: FAMILY MEDICINE

## 2022-08-17 PROCEDURE — 3074F SYST BP LT 130 MM HG: CPT | Performed by: FAMILY MEDICINE

## 2022-08-17 PROCEDURE — 81015 MICROSCOPIC EXAM OF URINE: CPT

## 2022-08-17 PROCEDURE — 99396 PREV VISIT EST AGE 40-64: CPT | Performed by: FAMILY MEDICINE

## 2022-08-17 PROCEDURE — 90715 TDAP VACCINE 7 YRS/> IM: CPT | Performed by: FAMILY MEDICINE

## 2022-08-17 RX ORDER — FLUOROMETHOLONE 0.1 %
SUSPENSION, DROPS(FINAL DOSAGE FORM)(ML) OPHTHALMIC (EYE)
COMMUNITY
Start: 2022-08-05

## 2022-08-17 NOTE — PATIENT INSTRUCTIONS
Healthy diet. Stay active. Do fasting blood work.     See dermatologist.  Call [phone] to schedule Mammogram after January 12, 2023

## 2022-08-18 DIAGNOSIS — R31.21 ASYMPTOMATIC MICROSCOPIC HEMATURIA: Primary | ICD-10-CM

## 2022-08-18 DIAGNOSIS — D72.819 LEUKOPENIA, UNSPECIFIED TYPE: ICD-10-CM

## 2022-08-19 ENCOUNTER — PATIENT MESSAGE (OUTPATIENT)
Dept: FAMILY MEDICINE CLINIC | Facility: CLINIC | Age: 42
End: 2022-08-19

## 2022-08-19 ENCOUNTER — HOSPITAL ENCOUNTER (OUTPATIENT)
Dept: ULTRASOUND IMAGING | Facility: HOSPITAL | Age: 42
Discharge: HOME | End: 2022-08-19
Attending: FAMILY MEDICINE
Payer: COMMERCIAL

## 2022-08-19 DIAGNOSIS — R31.21 ASYMPTOMATIC MICROSCOPIC HEMATURIA: ICD-10-CM

## 2022-08-19 DIAGNOSIS — R31.21 ASYMPTOMATIC MICROSCOPIC HEMATURIA: Primary | ICD-10-CM

## 2022-08-19 PROCEDURE — 76770 US EXAM ABDO BACK WALL COMP: CPT | Performed by: FAMILY MEDICINE

## 2022-08-24 DIAGNOSIS — R31.21 ASYMPTOMATIC MICROSCOPIC HEMATURIA: Primary | ICD-10-CM

## 2022-08-24 NOTE — TELEPHONE ENCOUNTER
From: Mikel Nathan  To: Caroline Gould MD  Sent: 8/19/2022 10:39 PM CDT  Subject: Ultrasound result    Hello  I saw the note that the ultrasound came bs k normal. Should I still be setting up An appt with e urologist? What Will ttt get e appointment be regarding?    Thanks

## 2022-08-24 NOTE — TELEPHONE ENCOUNTER
Please place referral for the patient to see Dr. Anuradha Rivera urologist# 3 visits. Diagnosis asymptomatic microscopic hematuria. Please notify patient that the referral was placed and we would like patient to be evaluated because of the blood in the urine on the testing.   Thank you

## 2022-09-01 LAB — HPV I/H RISK 1 DNA SPEC QL NAA+PROBE: NEGATIVE

## 2022-10-24 RX ORDER — LEVONORGESTREL AND ETHINYL ESTRADIOL 0.1-0.02MG
KIT ORAL
Qty: 84 TABLET | Refills: 0 | Status: SHIPPED | OUTPATIENT
Start: 2022-10-24

## 2023-01-17 RX ORDER — LEVONORGESTREL AND ETHINYL ESTRADIOL 0.1-0.02MG
KIT ORAL
Qty: 90 TABLET | Refills: 0 | Status: SHIPPED | OUTPATIENT
Start: 2023-01-17

## 2023-01-25 ENCOUNTER — HOSPITAL ENCOUNTER (OUTPATIENT)
Dept: MAMMOGRAPHY | Age: 43
Discharge: HOME | End: 2023-01-25
Attending: FAMILY MEDICINE
Payer: COMMERCIAL

## 2023-01-25 DIAGNOSIS — Z12.31 SCREENING MAMMOGRAM FOR BREAST CANCER: ICD-10-CM

## 2023-01-25 PROCEDURE — 77067 SCR MAMMO BI INCL CAD: CPT | Performed by: FAMILY MEDICINE

## 2023-01-25 PROCEDURE — 77063 BREAST TOMOSYNTHESIS BI: CPT | Performed by: FAMILY MEDICINE

## 2023-02-20 ENCOUNTER — PATIENT MESSAGE (OUTPATIENT)
Dept: FAMILY MEDICINE CLINIC | Facility: CLINIC | Age: 43
End: 2023-02-20

## 2023-02-20 DIAGNOSIS — Z12.31 ENCOUNTER FOR SCREENING MAMMOGRAM FOR MALIGNANT NEOPLASM OF BREAST: Primary | ICD-10-CM

## 2023-02-20 DIAGNOSIS — R92.2 DENSE BREAST TISSUE: ICD-10-CM

## 2023-02-20 NOTE — TELEPHONE ENCOUNTER
From: Kenneth Potts  To: Citlali Currie MD  Sent: 2/20/2023 9:25 AM CST  Subject: Question regarding [facility] EZEKIEL 2D+3D SCREENING [facility]([id number])    Good morning, this test is covered by my insurance so I do you want to schedule. Do I need a referral or am I also had to go ahead and schedule this? Can you give me the phone number to call to schedule? Thanks so much. Have a great day.  [place]

## 2023-02-20 NOTE — TELEPHONE ENCOUNTER
Pt called back. She would like to do the same test she had last year after her mammogram. Pt had a B/L complete breast ultrasound. Order placed per pt.s request. Pt will call and schedule.

## 2023-03-20 ENCOUNTER — HOSPITAL ENCOUNTER (OUTPATIENT)
Dept: ULTRASOUND IMAGING | Age: 43
Discharge: HOME | End: 2023-03-20
Attending: FAMILY MEDICINE
Payer: COMMERCIAL

## 2023-03-20 DIAGNOSIS — R92.2 DENSE BREAST TISSUE: ICD-10-CM

## 2023-03-20 PROCEDURE — 76641 ULTRASOUND BREAST COMPLETE: CPT | Performed by: FAMILY MEDICINE

## 2023-03-22 ENCOUNTER — OFFICE VISIT (OUTPATIENT)
Dept: FAMILY MEDICINE CLINIC | Facility: CLINIC | Age: 43
End: 2023-03-22
Payer: COMMERCIAL

## 2023-03-22 VITALS
BODY MASS INDEX: 23.86 KG/M2 | TEMPERATURE: 98 F | WEIGHT: 136.38 LBS | RESPIRATION RATE: 14 BRPM | HEART RATE: 76 BPM | SYSTOLIC BLOOD PRESSURE: 120 MMHG | HEIGHT: 63.5 IN | DIASTOLIC BLOOD PRESSURE: 80 MMHG

## 2023-03-22 DIAGNOSIS — L98.9 LESION OF SKIN OF NOSE: ICD-10-CM

## 2023-03-22 DIAGNOSIS — M54.2 NECK PAIN: Primary | ICD-10-CM

## 2023-03-22 PROCEDURE — 3074F SYST BP LT 130 MM HG: CPT | Performed by: FAMILY MEDICINE

## 2023-03-22 PROCEDURE — 99213 OFFICE O/P EST LOW 20 MIN: CPT | Performed by: FAMILY MEDICINE

## 2023-03-22 PROCEDURE — 3079F DIAST BP 80-89 MM HG: CPT | Performed by: FAMILY MEDICINE

## 2023-03-22 PROCEDURE — 3008F BODY MASS INDEX DOCD: CPT | Performed by: FAMILY MEDICINE

## 2023-03-22 RX ORDER — ESCITALOPRAM OXALATE 10 MG/1
TABLET ORAL
COMMUNITY
Start: 2023-02-23

## 2023-03-22 NOTE — PATIENT INSTRUCTIONS
Schedule physical therapy. Use ibuprofen as needed for pain. You can try Biofreeze or IcyHot and warm compresses over-the-counter. If you would continue the symptoms we can use prescription of muscle relaxant at bedtime. Seeing dermatologist for skin lesion evaluation.

## 2023-04-07 RX ORDER — LEVONORGESTREL AND ETHINYL ESTRADIOL 0.1-0.02MG
KIT ORAL
Qty: 84 TABLET | Refills: 0 | Status: SHIPPED | OUTPATIENT
Start: 2023-04-07

## 2023-05-30 ENCOUNTER — PATIENT MESSAGE (OUTPATIENT)
Dept: FAMILY MEDICINE CLINIC | Facility: CLINIC | Age: 43
End: 2023-05-30

## 2023-05-30 DIAGNOSIS — J34.89 SINUS PRESSURE: Primary | ICD-10-CM

## 2023-06-28 RX ORDER — LEVONORGESTREL AND ETHINYL ESTRADIOL 0.1-0.02MG
1 KIT ORAL DAILY
Qty: 84 TABLET | Refills: 0 | Status: SHIPPED | OUTPATIENT
Start: 2023-06-28

## 2023-06-28 RX ORDER — LEVONORGESTREL AND ETHINYL ESTRADIOL 0.1-0.02MG
1 KIT ORAL DAILY
Qty: 84 TABLET | Refills: 0 | Status: CANCELLED | OUTPATIENT
Start: 2023-06-28

## 2023-08-03 RX ORDER — LEVONORGESTREL AND ETHINYL ESTRADIOL 0.1-0.02MG
1 KIT ORAL DAILY
Qty: 3 EACH | Refills: 0 | Status: SHIPPED | OUTPATIENT
Start: 2023-08-03 | End: 2024-08-02

## 2023-10-11 RX ORDER — LEVONORGESTREL AND ETHINYL ESTRADIOL 0.1-0.02MG
1 KIT ORAL DAILY
Qty: 84 TABLET | Refills: 0 | Status: SHIPPED | OUTPATIENT
Start: 2023-10-11

## 2023-10-11 NOTE — TELEPHONE ENCOUNTER
LOV: 08/17/2022  for:CPX  Patient advised to RTC on:  CPX in 1 year. Nov;12/22/2023    Medication Quantity Refills Start End   Levonorgestrel-Ethinyl Estrad (VIENVA) 0.1-20 MG-MCG Oral Tab 3 each 0 8/3/2023 8/2/2024   Sig:   Take 1 tablet by mouth daily.          Protocol passed

## 2023-12-21 NOTE — TELEPHONE ENCOUNTER
Requested Prescriptions     Pending Prescriptions Disp Refills    AVIANE 0.1-20 MG-MCG Oral Tab [Pharmacy Med Name: Rosemary Burton 0.1-20 MG-MCG Oral Tablet] 84 tablet 3     Sig: TAKE 1 TABLET BY MOUTH DAILY     Last refill 10/11/23 #84  LOV 3/22/23  Future Appointments   Date Time Provider Salome Christensen   12/22/2023  8:00 AM Christina Deluca MD [facility]       Gynecology Medication Protocol [id number]/20/2023 10:01 PM    PASS--PENDING LAST PAP WNL--VIA MANUAL LOOKUP    Mammogram in past 12 months    Physical or Pelvic/Breast in past 12 or next 3 mos--VIA MANUAL LOOKUP        Ok to refill or wait for appointment tomorrow?

## 2023-12-22 ENCOUNTER — OFFICE VISIT (OUTPATIENT)
Dept: FAMILY MEDICINE CLINIC | Facility: CLINIC | Age: 43
End: 2023-12-22
Payer: COMMERCIAL

## 2023-12-22 ENCOUNTER — LAB ENCOUNTER (OUTPATIENT)
Dept: LAB | Age: 43
End: 2023-12-22
Attending: FAMILY MEDICINE
Payer: COMMERCIAL

## 2023-12-22 VITALS
HEIGHT: 63.5 IN | BODY MASS INDEX: 24.5 KG/M2 | WEIGHT: 140 LBS | HEART RATE: 76 BPM | TEMPERATURE: 99 F | SYSTOLIC BLOOD PRESSURE: 96 MMHG | DIASTOLIC BLOOD PRESSURE: 66 MMHG | RESPIRATION RATE: 16 BRPM

## 2023-12-22 DIAGNOSIS — Z00.00 LABORATORY EXAMINATION ORDERED AS PART OF A ROUTINE GENERAL MEDICAL EXAMINATION: ICD-10-CM

## 2023-12-22 DIAGNOSIS — Z13.89 SCREENING FOR GENITOURINARY CONDITION: ICD-10-CM

## 2023-12-22 DIAGNOSIS — Z00.00 PHYSICAL EXAM, ANNUAL: ICD-10-CM

## 2023-12-22 DIAGNOSIS — D72.819 LEUKOPENIA, UNSPECIFIED TYPE: Primary | ICD-10-CM

## 2023-12-22 DIAGNOSIS — Z12.31 SCREENING MAMMOGRAM FOR BREAST CANCER: ICD-10-CM

## 2023-12-22 DIAGNOSIS — N28.9 RENAL INSUFFICIENCY: ICD-10-CM

## 2023-12-22 DIAGNOSIS — Z12.4 SCREENING FOR CERVICAL CANCER: ICD-10-CM

## 2023-12-22 DIAGNOSIS — Z00.00 PHYSICAL EXAM, ANNUAL: Primary | ICD-10-CM

## 2023-12-22 LAB
ALBUMIN SERPL-MCNC: 3.8 G/DL (ref 3.4–5)
ALBUMIN/GLOB SERPL: 1 {RATIO} (ref 1–2)
ALP LIVER SERPL-CCNC: 49 U/L
ALT SERPL-CCNC: 26 U/L
ANION GAP SERPL CALC-SCNC: 3 MMOL/L (ref 0–18)
AST SERPL-CCNC: 22 U/L (ref 15–37)
BASOPHILS # BLD AUTO: 0.06 X10(3) UL (ref 0–0.2)
BASOPHILS NFR BLD AUTO: 1.7 %
BILIRUB SERPL-MCNC: 0.5 MG/DL (ref 0.1–2)
BILIRUB UR QL STRIP.AUTO: NEGATIVE
BUN BLD-MCNC: 19 MG/DL (ref 9–23)
CALCIUM BLD-MCNC: 8.8 MG/DL (ref 8.5–10.1)
CHLORIDE SERPL-SCNC: 110 MMOL/L (ref 98–112)
CHOLEST SERPL-MCNC: 226 MG/DL (ref ?–200)
CLARITY UR REFRACT.AUTO: CLEAR
CO2 SERPL-SCNC: 26 MMOL/L (ref 21–32)
COLOR UR AUTO: YELLOW
CREAT BLD-MCNC: 1.21 MG/DL
EGFRCR SERPLBLD CKD-EPI 2021: 57 ML/MIN/1.73M2 (ref 60–?)
EOSINOPHIL # BLD AUTO: 0.03 X10(3) UL (ref 0–0.7)
EOSINOPHIL NFR BLD AUTO: 0.8 %
ERYTHROCYTE [DISTWIDTH] IN BLOOD BY AUTOMATED COUNT: 12.9 %
FASTING PATIENT LIPID ANSWER: YES
FASTING STATUS PATIENT QL REPORTED: YES
GLOBULIN PLAS-MCNC: 3.8 G/DL (ref 2.8–4.4)
GLUCOSE BLD-MCNC: 84 MG/DL (ref 70–99)
GLUCOSE UR STRIP.AUTO-MCNC: NORMAL MG/DL
HCT VFR BLD AUTO: 46.4 %
HDLC SERPL-MCNC: 104 MG/DL (ref 40–59)
HGB BLD-MCNC: 15 G/DL
IMM GRANULOCYTES # BLD AUTO: 0 X10(3) UL (ref 0–1)
IMM GRANULOCYTES NFR BLD: 0 %
KETONES UR STRIP.AUTO-MCNC: NEGATIVE MG/DL
LDLC SERPL CALC-MCNC: 116 MG/DL (ref ?–100)
LEUKOCYTE ESTERASE UR QL STRIP.AUTO: NEGATIVE
LYMPHOCYTES # BLD AUTO: 1.52 X10(3) UL (ref 1–4)
LYMPHOCYTES NFR BLD AUTO: 43.1 %
MCH RBC QN AUTO: 28.6 PG (ref 26–34)
MCHC RBC AUTO-ENTMCNC: 32.3 G/DL (ref 31–37)
MCV RBC AUTO: 88.4 FL
MONOCYTES # BLD AUTO: 0.37 X10(3) UL (ref 0.1–1)
MONOCYTES NFR BLD AUTO: 10.5 %
NEUTROPHILS # BLD AUTO: 1.55 X10 (3) UL (ref 1.5–7.7)
NEUTROPHILS # BLD AUTO: 1.55 X10(3) UL (ref 1.5–7.7)
NEUTROPHILS NFR BLD AUTO: 43.9 %
NITRITE UR QL STRIP.AUTO: NEGATIVE
NONHDLC SERPL-MCNC: 122 MG/DL (ref ?–130)
OSMOLALITY SERPL CALC.SUM OF ELEC: 289 MOSM/KG (ref 275–295)
PH UR STRIP.AUTO: 7.5 [PH] (ref 5–8)
PLATELET # BLD AUTO: 253 10(3)UL (ref 150–450)
POTASSIUM SERPL-SCNC: 4.3 MMOL/L (ref 3.5–5.1)
PROT SERPL-MCNC: 7.6 G/DL (ref 6.4–8.2)
RBC # BLD AUTO: 5.25 X10(6)UL
SODIUM SERPL-SCNC: 139 MMOL/L (ref 136–145)
SP GR UR STRIP.AUTO: 1.03 (ref 1–1.03)
TRIGL SERPL-MCNC: 37 MG/DL (ref 30–149)
TSI SER-ACNC: 0.8 MIU/ML (ref 0.36–3.74)
UROBILINOGEN UR STRIP.AUTO-MCNC: NORMAL MG/DL
VLDLC SERPL CALC-MCNC: 6 MG/DL (ref 0–30)
WBC # BLD AUTO: 3.5 X10(3) UL (ref 4–11)

## 2023-12-22 PROCEDURE — 85025 COMPLETE CBC W/AUTO DIFF WBC: CPT

## 2023-12-22 PROCEDURE — 84443 ASSAY THYROID STIM HORMONE: CPT

## 2023-12-22 PROCEDURE — 3008F BODY MASS INDEX DOCD: CPT | Performed by: FAMILY MEDICINE

## 2023-12-22 PROCEDURE — 88175 CYTOPATH C/V AUTO FLUID REDO: CPT | Performed by: FAMILY MEDICINE

## 2023-12-22 PROCEDURE — 80061 LIPID PANEL: CPT

## 2023-12-22 PROCEDURE — 99396 PREV VISIT EST AGE 40-64: CPT | Performed by: FAMILY MEDICINE

## 2023-12-22 PROCEDURE — 80053 COMPREHEN METABOLIC PANEL: CPT

## 2023-12-22 PROCEDURE — 87624 HPV HI-RISK TYP POOLED RSLT: CPT | Performed by: FAMILY MEDICINE

## 2023-12-22 PROCEDURE — 3078F DIAST BP <80 MM HG: CPT | Performed by: FAMILY MEDICINE

## 2023-12-22 PROCEDURE — 81001 URINALYSIS AUTO W/SCOPE: CPT

## 2023-12-22 PROCEDURE — 3074F SYST BP LT 130 MM HG: CPT | Performed by: FAMILY MEDICINE

## 2023-12-22 RX ORDER — LEVONORGESTREL AND ETHINYL ESTRADIOL 0.1-0.02MG
1 KIT ORAL DAILY
Qty: 84 TABLET | Refills: 3 | Status: SHIPPED | OUTPATIENT
Start: 2023-12-22

## 2023-12-25 LAB — HPV I/H RISK 1 DNA SPEC QL NAA+PROBE: NEGATIVE

## 2023-12-28 LAB
.: NORMAL
.: NORMAL

## 2024-03-15 ENCOUNTER — HOSPITAL ENCOUNTER (OUTPATIENT)
Dept: MAMMOGRAPHY | Age: 44
Discharge: HOME | End: 2024-03-15
Attending: FAMILY MEDICINE
Payer: COMMERCIAL

## 2024-03-15 DIAGNOSIS — Z12.31 SCREENING MAMMOGRAM FOR BREAST CANCER: ICD-10-CM

## 2024-03-15 PROCEDURE — 77067 SCR MAMMO BI INCL CAD: CPT | Performed by: FAMILY MEDICINE

## 2024-03-15 PROCEDURE — 77063 BREAST TOMOSYNTHESIS BI: CPT | Performed by: FAMILY MEDICINE

## 2024-03-25 ENCOUNTER — PATIENT MESSAGE (OUTPATIENT)
Dept: FAMILY MEDICINE CLINIC | Facility: CLINIC | Age: 44
End: 2024-03-25

## 2024-03-25 DIAGNOSIS — R92.30 DENSE BREASTS: Primary | ICD-10-CM

## 2024-03-25 NOTE — TELEPHONE ENCOUNTER
From: Tamiko Reveles  To: Alyx Gaytan  Sent: 3/25/2024 5:08 PM CDT  Subject: Mammogram and ultrasound     Hello, my ultrasound follow up from my mammogram is covered by insurance. Can you please send a referral on where I can go to get this ultrasound completed?

## 2024-05-13 ENCOUNTER — HOSPITAL ENCOUNTER (OUTPATIENT)
Dept: MAMMOGRAPHY | Facility: HOSPITAL | Age: 44
Discharge: HOME | End: 2024-05-13
Attending: FAMILY MEDICINE

## 2024-05-13 DIAGNOSIS — R92.30 DENSE BREASTS: ICD-10-CM

## 2024-05-13 PROCEDURE — 76641 ULTRASOUND BREAST COMPLETE: CPT | Performed by: FAMILY MEDICINE

## 2024-05-16 ENCOUNTER — PATIENT MESSAGE (OUTPATIENT)
Dept: FAMILY MEDICINE CLINIC | Facility: CLINIC | Age: 44
End: 2024-05-16

## 2024-05-16 DIAGNOSIS — D22.9 JUNCTIONAL MELANOCYTIC NEVUS OF SKIN: Primary | ICD-10-CM

## 2024-05-17 NOTE — TELEPHONE ENCOUNTER
From: Tamiko Reveles  To: Alyx Gaytan  Sent: 5/16/2024 6:22 PM CDT  Subject: ###URGENT###Referral    Hello  I am having a few moles removed on Monday at [facility] derm. They waited till the last min to tell me another referral is needed. Can you help?? The text I received is below. I’ve had this appointment for almost 5 months and I do not want to lose the appt.     Dear Patient, this is [facility] reaching out to you regarding your upcoming appointment on May 20, 2024. Based on your type of insurance, you require a referral from your PCP. Please reach out to your PCP for a referral to be seen at your next appointment. It is patient responsibility to follow up BEFORE appt to make sure referral has been issued. We need to receive it before your appointment. Many times, referrals take a minimum of 2 weeks to go through, so need to allow enough time. Absent referral, we can still see you as a SELF-PAY patient. Thank you!

## 2024-05-17 NOTE — TELEPHONE ENCOUNTER
Received a call from Dorcas ([facility]) and she stated that patient will have two upcoming procedures with Dr. Morse so they are requesting a referral for Plastic Surgery, Diagnosis: Junctional Melanocytic nevus of skin. First procedure will be on Monday 5/20/2024.    Routing to Dr. Gaytan, please review referral and sign if appropriate. Thank you.

## 2024-07-16 ENCOUNTER — TELEPHONE (OUTPATIENT)
Dept: FAMILY MEDICINE CLINIC | Facility: CLINIC | Age: 44
End: 2024-07-16

## 2024-07-16 NOTE — TELEPHONE ENCOUNTER
Patient called. She is wanting to know if Dr. Gaytan can write a letter for her to [employer] company. She is trying to get long term disability insurance. They told her she can get it but it will exclude any spine issues. She has not had any problems with her spine since 07/30/2014. She had an xray and it showed the following:       FINDINGS:  There is moderate upper to mid thoracic scoliosis. No significant cervical scoliosis. There is straightening of the expected lordosis. No subluxation. Vertebral body heights appear normal. There is mild disc space narrowing C4-C5 and C5-C6. No    prevertebral soft tissue swelling.      CONCLUSION:    1. Mild disc space narrowing C4-C5 and C5-C6.   2. Thoracic scoliosis.

## 2024-07-16 NOTE — TELEPHONE ENCOUNTER
Patient has questions about long term disability. Patient states information that was disclosed to Randy Ladd with [employer] was not accurate. Please advise.

## 2024-07-18 NOTE — TELEPHONE ENCOUNTER
I called the patient and notified her of the response from Dr. Dow. Pt.verbalized understanding

## 2024-07-18 NOTE — TELEPHONE ENCOUNTER
Yes we  could write a letter however patient had appointment for neck pain evaluation in on March 22, 2023.  We did refered patient for the physical therapy at that time.  Not sure if writing letter would help.  Thank you

## 2024-08-15 ENCOUNTER — TELEPHONE (OUTPATIENT)
Dept: FAMILY MEDICINE CLINIC | Facility: CLINIC | Age: 44
End: 2024-08-15

## 2024-08-15 NOTE — TELEPHONE ENCOUNTER
Medical Records Request received from [email or web address] for records from past 5 years.     Release original sent to [email or web address] on 8/15/2024. Copy sent to scanning.

## 2024-08-19 ENCOUNTER — TELEPHONE (OUTPATIENT)
Dept: FAMILY MEDICINE CLINIC | Facility: CLINIC | Age: 44
End: 2024-08-19

## 2024-08-19 NOTE — TELEPHONE ENCOUNTER
Patient called states she is having Shortness of breath that has been going on for a while,  patient wants to schedule an appointment,    Sending to Triage nurse.

## 2024-08-19 NOTE — TELEPHONE ENCOUNTER
Spoke with patient regarding shortness of breath. Patient states the shortness of breath has been going on for awhile now, unknown amount of time to patient. She does not feel like it is urgent, no swelling or constriction of the throat. Oxygen level at 97-98%. Patient does not want to go to walk-in clinic, she is fine with appt 8/21 at 8am.

## 2024-08-21 ENCOUNTER — HOSPITAL ENCOUNTER (OUTPATIENT)
Dept: GENERAL RADIOLOGY | Age: 44
Discharge: HOME | End: 2024-08-21
Attending: FAMILY MEDICINE
Payer: COMMERCIAL

## 2024-08-21 ENCOUNTER — LAB ENCOUNTER (OUTPATIENT)
Dept: LAB | Age: 44
End: 2024-08-21
Attending: FAMILY MEDICINE
Payer: COMMERCIAL

## 2024-08-21 ENCOUNTER — OFFICE VISIT (OUTPATIENT)
Dept: FAMILY MEDICINE CLINIC | Facility: CLINIC | Age: 44
End: 2024-08-21
Payer: COMMERCIAL

## 2024-08-21 VITALS
WEIGHT: 147 LBS | BODY MASS INDEX: 25.72 KG/M2 | HEIGHT: 63.5 IN | DIASTOLIC BLOOD PRESSURE: 80 MMHG | TEMPERATURE: 97 F | HEART RATE: 80 BPM | RESPIRATION RATE: 14 BRPM | SYSTOLIC BLOOD PRESSURE: 122 MMHG | OXYGEN SATURATION: 99 %

## 2024-08-21 DIAGNOSIS — N28.9 RENAL INSUFFICIENCY: ICD-10-CM

## 2024-08-21 DIAGNOSIS — R06.02 SHORTNESS OF BREATH: ICD-10-CM

## 2024-08-21 DIAGNOSIS — D72.819 LEUKOPENIA, UNSPECIFIED TYPE: ICD-10-CM

## 2024-08-21 DIAGNOSIS — R06.02 SHORTNESS OF BREATH: Primary | ICD-10-CM

## 2024-08-21 LAB
ALBUMIN SERPL-MCNC: 4.5 G/DL (ref 3.2–4.8)
ALBUMIN/GLOB SERPL: 1.7 {RATIO} (ref 1–2)
ALP LIVER SERPL-CCNC: 70 U/L
ALT SERPL-CCNC: 23 U/L
ANION GAP SERPL CALC-SCNC: 3 MMOL/L (ref 0–18)
AST SERPL-CCNC: 26 U/L (ref ?–34)
ATRIAL RATE: 76 BPM
BASOPHILS # BLD AUTO: 0.06 X10(3) UL (ref 0–0.2)
BASOPHILS NFR BLD AUTO: 1.2 %
BILIRUB SERPL-MCNC: 0.4 MG/DL (ref 0.3–1.2)
BUN BLD-MCNC: 17 MG/DL (ref 9–23)
CALCIUM BLD-MCNC: 9.7 MG/DL (ref 8.7–10.4)
CHLORIDE SERPL-SCNC: 104 MMOL/L (ref 98–112)
CO2 SERPL-SCNC: 29 MMOL/L (ref 21–32)
CREAT BLD-MCNC: 0.88 MG/DL
D DIMER PPP FEU-MCNC: 0.32 UG/ML FEU (ref ?–0.5)
EGFRCR SERPLBLD CKD-EPI 2021: 84 ML/MIN/1.73M2 (ref 60–?)
EOSINOPHIL # BLD AUTO: 0.05 X10(3) UL (ref 0–0.7)
EOSINOPHIL NFR BLD AUTO: 1 %
ERYTHROCYTE [DISTWIDTH] IN BLOOD BY AUTOMATED COUNT: 13.2 %
FASTING STATUS PATIENT QL REPORTED: YES
GLOBULIN PLAS-MCNC: 2.7 G/DL (ref 2–3.5)
GLUCOSE BLD-MCNC: 72 MG/DL (ref 70–99)
HCT VFR BLD AUTO: 41.8 %
HGB BLD-MCNC: 13.9 G/DL
IMM GRANULOCYTES # BLD AUTO: 0 X10(3) UL (ref 0–1)
IMM GRANULOCYTES NFR BLD: 0 %
LYMPHOCYTES # BLD AUTO: 1.79 X10(3) UL (ref 1–4)
LYMPHOCYTES NFR BLD AUTO: 37.2 %
MCH RBC QN AUTO: 28.6 PG (ref 26–34)
MCHC RBC AUTO-ENTMCNC: 33.3 G/DL (ref 31–37)
MCV RBC AUTO: 86 FL
MONOCYTES # BLD AUTO: 0.66 X10(3) UL (ref 0.1–1)
MONOCYTES NFR BLD AUTO: 13.7 %
NEUTROPHILS # BLD AUTO: 2.25 X10 (3) UL (ref 1.5–7.7)
NEUTROPHILS # BLD AUTO: 2.25 X10(3) UL (ref 1.5–7.7)
NEUTROPHILS NFR BLD AUTO: 46.9 %
OSMOLALITY SERPL CALC.SUM OF ELEC: 282 MOSM/KG (ref 275–295)
P AXIS: 45 DEGREES
P-R INTERVAL: 126 MS
PLATELET # BLD AUTO: 236 10(3)UL (ref 150–450)
POTASSIUM SERPL-SCNC: 4.4 MMOL/L (ref 3.5–5.1)
PROT SERPL-MCNC: 7.2 G/DL (ref 5.7–8.2)
Q-T INTERVAL: 378 MS
QRS DURATION: 80 MS
QTC CALCULATION (BEZET): 425 MS
R AXIS: 66 DEGREES
RBC # BLD AUTO: 4.86 X10(6)UL
SODIUM SERPL-SCNC: 136 MMOL/L (ref 136–145)
T AXIS: 55 DEGREES
TSI SER-ACNC: 0.98 MIU/ML (ref 0.55–4.78)
VENTRICULAR RATE: 76 BPM
WBC # BLD AUTO: 4.8 X10(3) UL (ref 4–11)

## 2024-08-21 PROCEDURE — 85379 FIBRIN DEGRADATION QUANT: CPT

## 2024-08-21 PROCEDURE — 85025 COMPLETE CBC W/AUTO DIFF WBC: CPT

## 2024-08-21 PROCEDURE — 71046 X-RAY EXAM CHEST 2 VIEWS: CPT | Performed by: FAMILY MEDICINE

## 2024-08-21 PROCEDURE — 80053 COMPREHEN METABOLIC PANEL: CPT

## 2024-08-21 PROCEDURE — 99214 OFFICE O/P EST MOD 30 MIN: CPT | Performed by: FAMILY MEDICINE

## 2024-08-21 PROCEDURE — 84443 ASSAY THYROID STIM HORMONE: CPT

## 2024-08-21 PROCEDURE — 93000 ELECTROCARDIOGRAM COMPLETE: CPT | Performed by: FAMILY MEDICINE

## 2024-08-21 NOTE — PATIENT INSTRUCTIONS
Do blood work today.  Do chest x-ray.  Monitor symptoms.  Further recommendations pending test results.

## 2024-08-21 NOTE — PROGRESS NOTES
Tamiko Reveles is a 43 year old female.  SOB  HPI:   Patient complains of having episodes of the shortness of breath recently they are coming more often.  She is episode when she has to take deeper breaths.  Becoming random.  Not related to activity sometimes it could happen at rest.  In the past she was been gated to could be related to medications.  She was on Wellbutrin and Viibryd medications were stopped because of the shortness of breath.  Right now she is back on Prozac and previously Prozac would not be for any problems with breathing.  Last time she episodes of shortness of breath last night even in the office she was feeling little bit out of breath.  She is not taking birth control pills for 1 month.  No family history of blood clots.  No cough.  No runny nose no postnasal drip.  No chest pain.  No palpitations.  No history of smoking.  She is taking fluoxetine right now for anxiety and she said that she is always anxious person.  No history of allergies.  Shortness of breath usually goes away on its own.  Current Outpatient Medications   Medication Sig Dispense Refill    FLUoxetine 20 MG Oral Cap Take 2 capsules (40 mg total) by mouth daily.      Levonorgestrel-Ethinyl Estrad (LESSINA) 0.1-20 MG-MCG Oral Tab Take 1 tablet by mouth daily. 84 tablet 0    Levonorgestrel-Ethinyl Estrad (AVIANE) 0.1-20 MG-MCG Oral Tab Take 1 tablet by mouth daily. 84 tablet 3      Past Medical History:    Abdominal pain    Anxiety state, unspecified    Bloating    Depression    Diarrhea, unspecified    Wears glasses      Social History:  Social History     Socioeconomic History    Marital status: Single   Tobacco Use    Smoking status: Never    Smokeless tobacco: Never   Vaping Use    Vaping status: Never Used   Substance and Sexual Activity    Alcohol use: No     Alcohol/week: 0.0 standard drinks of alcohol     Comment: quit 2010    Drug use: No    Sexual activity: Yes     Partners: Male   Other Topics Concern    Caffeine  Concern No     Comment: 6 daily diet Dr. Pepper    Exercise Yes     Comment: 5-6 times a week    Seat Belt Yes    Self-Exams No        REVIEW OF SYSTEMS:   GENERAL HEALTH: feels well otherwise no fever no chills  SKIN: denies any unusual skin lesions or rashes  HEENT no congestion no runny nose no sore throat  Neck no neck pain   RESPIRATORY:  shortness of breath  CARDIOVASCULAR: denies chest pain on exertion  GI: denies abdominal pain and denies heartburn  NEURO: denies headaches  Psych stable mood    EXAM:   /80 (BP Location: Left arm, Patient Position: Sitting, Cuff Size: adult)   Pulse 80   Temp 97.3 °F (36.3 °C) (Temporal)   Resp 14   Ht 5' 3.5\" (1.613 m)   Wt 147 lb (66.7 kg)   LMP 02/14/2024   SpO2 99%   BMI 25.63 kg/m²   GENERAL: well developed, well nourished,in no apparent distress  SKIN: no rashes,no suspicious lesions  HEENT: atraumatic, normocephalic,ears and throat are clear  NECK: supple,no adenopathy,  LUNGS: clear to auscultation,no  wheezes no crackles  CARDIO: RRR without murmur  GI: good BS's,no masses, HSM or tenderness  EXTREMITIES: no edema  Psychiatric - alert  and oriented x3, normal mood    PROCEDURE:  XR CHEST PA + LAT CHEST (CPT=71046)     INDICATIONS:  R06.02 Shortness of breath     COMPARISON:  None.     TECHNIQUE:  PA and lateral chest radiographs were obtained.     PATIENT STATED HISTORY: (As transcribed by Technologist)  Patient complains of shortness of breath. for a few months.  She denies cough, fever and pain.  She stated history of breast implant surgery.         FINDINGS:    LUNGS:  No focal consolidation.  Normal vascularity.  CARDIAC:  Normal size cardiac silhouette.  MEDIASTINUM:  Normal.  PLEURA:  Normal.  No pleural effusions.  BONES:  Mild dextroscoliosis of the thoracic spine.                   Impression   CONCLUSION:  No consolidation.  Mild dextroscoliosis of the thoracic spine.        LOCATION:  [place]        Dictated by (CST): Remberto Portillo MD on  8/21/2024 at 9:06 AM      Finalized by (CST): Remberto Portillo MD on 8/21/2024 at 9:06 AM         Results for orders placed or performed in visit on 08/21/24   EKG with interpretation and Report -IN OFFICE [[id number]]   Result Value Ref Range    Ventricular rate 76 BPM    Atrial rate 76 BPM    P-R Interval 126 ms    QRS Duration 80 ms    Q-T Interval 378 ms    QTC Calculation (Bezet) 425 ms    P Axis 45 degrees    R Axis 66 degrees    T Axis 55 degrees      Normal sinus rhythm  Normal ECG  No previous ECGs found in [facility]  Confirmed by Alyx Gaytan ([id number]) on 8/21/2024 8:59:20 AM      Specimen Collected: 08/21/24  8:35 AM Last Resulted        Test done today reviewed CMP normal thyroid normal, D-dimer negative.  CBC normal.  ASSESSMENT AND PLAN:     Encounter Diagnosis   Name Primary?    Shortness of breath Yes       Orders Placed This Encounter   Procedures    CBC With Differential With Platelet    Comp Metabolic Panel (14)    TSH W Reflex To Free T4    D-Dimer [E]       Meds & Refills for this Visit:  Requested Prescriptions      No prescriptions requested or ordered in this encounter   Do blood work today.  Do chest x-ray.  Monitor symptoms.  Further recommendations pending test results.    X-ray came back unremarkable , D-dimer negative  - pulmonary function test ordered patient will have this completed.    Imaging & Consults:  ELECTROCARDIOGRAM, COMPLETE     The patient indicates understanding of these issues and agrees to the plan.  The patient is asked to return in prn.   The note was dictated using speech recognition software.  Accuracy and grammar in transcription may be subject to error.

## 2024-08-24 ENCOUNTER — RT VISIT (OUTPATIENT)
Dept: RESPIRATORY THERAPY | Facility: HOSPITAL | Age: 44
End: 2024-08-24
Attending: FAMILY MEDICINE
Payer: COMMERCIAL

## 2024-08-24 DIAGNOSIS — R06.02 SHORTNESS OF BREATH: ICD-10-CM

## 2024-08-24 PROCEDURE — 94010 BREATHING CAPACITY TEST: CPT | Performed by: INTERNAL MEDICINE

## 2024-08-24 PROCEDURE — 94729 DIFFUSING CAPACITY: CPT | Performed by: INTERNAL MEDICINE

## 2024-08-24 PROCEDURE — 94726 PLETHYSMOGRAPHY LUNG VOLUMES: CPT | Performed by: INTERNAL MEDICINE

## 2024-08-29 NOTE — PROCEDURES
Patient is a 43-year-old female being assessed with a diagnosis of shortness of breath.    Results  FEV1 3.33 L supernormal 120% of predicted  FVC is 3.9 L normal at 116% of predicted  Ratio is normal at 85%  MVV within normal range 87% of predicted  Flow volume loop without distinct abnormality  Total lung capacity 6.15 L normal at 118% of predicted  Residual volume is 2.25 L hyperinflated 165% of predicted  Diffusion capacity is normal at 101% of predicted.  When corrected for alveolar volume it is 99% of predicted.    Impression no evidence of airways obstruction or restrictive defect and diffusion is normal.-Mild hyperinflation of the residual volume can be seen as air trapping suggestive of possible asthma with clinical correlation suggested for possible role of bronchoprovocation.    No prior studies found

## 2024-10-10 ENCOUNTER — TELEPHONE (OUTPATIENT)
Dept: FAMILY MEDICINE CLINIC | Facility: CLINIC | Age: 44
End: 2024-10-10

## 2024-10-10 NOTE — TELEPHONE ENCOUNTER
Appointment for: Tamiko Reveles ([id number])  Visit type: MYCHART EXAM (2964)  1/15/2025 7:30 AM (30 minutes) with Alyx Gaytan in [facility] [address] [place]     Patient comments:  I want to have an STD test done. I haven’t been exposed. Would like to get in sooner if possible

## 2024-10-10 NOTE — TELEPHONE ENCOUNTER
Journeys message was sent to the patient please check if she was able to reviewed my message.  Thank you

## 2024-10-10 NOTE — TELEPHONE ENCOUNTER
I called and spoke to the patient. She would like an appointment for STD testing. She has no symptoms but did state \" I had unprotected sex with someone 1 week ago and would like to get checked.\" Patient is aware she may not hear back from us until tomorrow.

## 2024-10-11 ENCOUNTER — LAB ENCOUNTER (OUTPATIENT)
Dept: LAB | Age: 44
End: 2024-10-11
Attending: FAMILY MEDICINE
Payer: COMMERCIAL

## 2024-10-11 ENCOUNTER — OFFICE VISIT (OUTPATIENT)
Dept: FAMILY MEDICINE CLINIC | Facility: CLINIC | Age: 44
End: 2024-10-11
Payer: COMMERCIAL

## 2024-10-11 VITALS
HEIGHT: 63.5 IN | TEMPERATURE: 97 F | BODY MASS INDEX: 26 KG/M2 | DIASTOLIC BLOOD PRESSURE: 70 MMHG | HEART RATE: 72 BPM | SYSTOLIC BLOOD PRESSURE: 107 MMHG

## 2024-10-11 DIAGNOSIS — Z11.3 SCREENING FOR STD (SEXUALLY TRANSMITTED DISEASE): ICD-10-CM

## 2024-10-11 DIAGNOSIS — Z11.3 SCREENING FOR STD (SEXUALLY TRANSMITTED DISEASE): Primary | ICD-10-CM

## 2024-10-11 LAB
HAV IGM SER QL: NONREACTIVE
HBV CORE IGM SER QL: NONREACTIVE
HBV SURFACE AG SERPL QL IA: NONREACTIVE
HCV AB SERPL QL IA: NONREACTIVE
T PALLIDUM AB SER QL IA: NONREACTIVE

## 2024-10-11 PROCEDURE — 87801 DETECT AGNT MULT DNA AMPLI: CPT | Performed by: FAMILY MEDICINE

## 2024-10-11 PROCEDURE — 36415 COLL VENOUS BLD VENIPUNCTURE: CPT

## 2024-10-11 PROCEDURE — 87491 CHLMYD TRACH DNA AMP PROBE: CPT | Performed by: FAMILY MEDICINE

## 2024-10-11 PROCEDURE — 87389 HIV-1 AG W/HIV-1&-2 AB AG IA: CPT

## 2024-10-11 PROCEDURE — 87591 N.GONORRHOEAE DNA AMP PROB: CPT | Performed by: FAMILY MEDICINE

## 2024-10-11 PROCEDURE — 80074 ACUTE HEPATITIS PANEL: CPT

## 2024-10-11 PROCEDURE — 86780 TREPONEMA PALLIDUM: CPT

## 2024-10-11 NOTE — PROGRESS NOTES
Tamiko Reveles is a 43 year old female.  STD screening  HPI:   Patient had unprotected intercourse a week ago.  Asymptomatic.  Would like STD screening.    Current Outpatient Medications   Medication Sig Dispense Refill    FLUoxetine 20 MG Oral Cap Take 2 capsules (40 mg total) by mouth daily.      Levonorgestrel-Ethinyl Estrad (AVIANE) 0.1-20 MG-MCG Oral Tab Take 1 tablet by mouth daily. 84 tablet 3    Levonorgestrel-Ethinyl Estrad (LESSINA) 0.1-20 MG-MCG Oral Tab Take 1 tablet by mouth daily. 84 tablet 0      Past Medical History:    Abdominal pain    Anxiety state, unspecified    Bloating    Depression    Diarrhea, unspecified    Wears glasses      Social History:  Social History     Socioeconomic History    Marital status: Single   Tobacco Use    Smoking status: Never    Smokeless tobacco: Never   Vaping Use    Vaping status: Never Used   Substance and Sexual Activity    Alcohol use: No     Alcohol/week: 0.0 standard drinks of alcohol     Comment: quit 2010    Drug use: No    Sexual activity: Yes     Partners: Male   Other Topics Concern    Caffeine Concern No     Comment: 6 daily diet Dr. Pepper    Exercise Yes     Comment: 5-6 times a week    Seat Belt Yes    Self-Exams No        REVIEW OF SYSTEMS:   GENERAL HEALTH: feels well otherwise  SKIN: denies any unusual skin lesions or rashes  RESPIRATORY: denies shortness of breath with exertion  CARDIOVASCULAR: denies chest pain on exertion  GI: denies abdominal pain and denies heartburn  NEURO: denies headaches   no pelvic pain no discharge  Psych normal mood    EXAM:   /70 (BP Location: Left arm, Patient Position: Sitting, Cuff Size: adult)   Pulse 72   Temp 97 °F (36.1 °C) (Temporal)   Ht 5' 3.5\" (1.613 m)   Wt (P) 134 lb (60.8 kg)   LMP 02/14/2024   BMI (P) 23.36 kg/m²   GENERAL: well developed, well nourished,in no apparent distress  SKIN: no rashes,no suspicious lesions  HEENT: atraumatic, normocephalic,  NECK: supple,  LUNGS: clear t  GI:  Tenderness  EXTREMITIES: no edema   normal introitus, cervix pink, swab collected no adnexal tenderness no cervical motion tenderness  Psychiatric - alert  and oriented x3, normal mood      ASSESSMENT AND PLAN:     Encounter Diagnosis   Name Primary?    Screening for STD (sexually transmitted disease) Yes       Orders Placed This Encounter   Procedures    T Pall[place] Screening [place]    HIV AG AB Combo (Consent Obtained prechecked)    Hepatitis Panel, Acute (4) [E]    Chlamydia/Gc Amplification [E]       Meds & Refills for this Visit:  Requested Prescriptions      No prescriptions requested or ordered in this encounter   Further recommendations pending test results.  Always use protection with intercourse.  Offered to check pregnancy test - patient deferred.  Imaging & Consults:  None    The patient indicates understanding of these issues and agrees to the plan.  The patient is asked to return in prn.  The note was dictated using speech recognition software.  Accuracy and grammar in transcription may be subject to error.

## 2024-10-12 RX ORDER — LEVONORGESTREL AND ETHINYL ESTRADIOL 0.1-0.02MG
1 KIT ORAL DAILY
Qty: 84 TABLET | Refills: 0 | Status: SHIPPED | OUTPATIENT
Start: 2024-10-12

## 2024-10-12 NOTE — TELEPHONE ENCOUNTER
LOV:  12/22/2023 for: CPX  Patient advised to RTC on: CPX in 1 year    Nov:1/15/2025    Medication Quantity Refills Start End   Levonorgestrel-Ethinyl Estrad (LESSINA) 0.1-20 MG-MCG Oral Tab 84 tablet 0 6/28/2023 --   Sig:   Take 1 tablet by mouth daily.

## 2024-10-14 LAB
C TRACH DNA SPEC QL NAA+PROBE: NEGATIVE
N GONORRHOEA DNA SPEC QL NAA+PROBE: NEGATIVE

## 2024-12-10 ENCOUNTER — PATIENT MESSAGE (OUTPATIENT)
Dept: FAMILY MEDICINE CLINIC | Facility: CLINIC | Age: 44
End: 2024-12-10

## 2024-12-10 DIAGNOSIS — L98.9 LESION OF SKIN OF NOSE: Primary | ICD-10-CM

## 2024-12-16 RX ORDER — LEVONORGESTREL AND ETHINYL ESTRADIOL 0.1-0.02MG
1 KIT ORAL DAILY
Qty: 84 TABLET | Refills: 0 | Status: SHIPPED | OUTPATIENT
Start: 2024-12-16

## 2024-12-16 NOTE — TELEPHONE ENCOUNTER
Please call patient to schedule a physical, then route to Dr. Gaytan. Thank you!        Last Office Visit: 12/22/23 (last seen 10/11/24)  Last Refill: 10/12/24  Return to Clinic: 1 year  Protocol: passed  NOV: n/a   Requested Prescriptions     Pending Prescriptions Disp Refills    FALMINA 0.1-20 MG-MCG Oral Tab [Pharmacy Med Name: FALMINA TAB #] 84 tablet 3     Sig: TAKE 1 TABLET BY MOUTH DAILY         Please approve if appropriate.     Thank you!

## 2025-02-09 ENCOUNTER — PATIENT MESSAGE (OUTPATIENT)
Dept: FAMILY MEDICINE CLINIC | Facility: CLINIC | Age: 45
End: 2025-02-09

## 2025-02-17 RX ORDER — LEVONORGESTREL AND ETHINYL ESTRADIOL 0.1-0.02MG
1 KIT ORAL DAILY
Qty: 84 TABLET | Refills: 0 | Status: SHIPPED | OUTPATIENT
Start: 2025-02-17

## 2025-02-17 NOTE — TELEPHONE ENCOUNTER
LOV:12/22/2023   for: CPX  Patient advised to RTC on: CPX in 1 year.    Nov:2/18/2025    Medication Quantity Refills Start End   FALMINA 0.1-20 MG-MCG Oral Tab 84 tablet 0 12/16/2024 --   Sig:   TAKE 1 TABLET BY MOUTH DAILY

## 2025-02-18 ENCOUNTER — OFFICE VISIT (OUTPATIENT)
Dept: FAMILY MEDICINE CLINIC | Facility: CLINIC | Age: 45
End: 2025-02-18
Payer: COMMERCIAL

## 2025-02-18 ENCOUNTER — LAB ENCOUNTER (OUTPATIENT)
Dept: LAB | Age: 45
End: 2025-02-18
Attending: FAMILY MEDICINE
Payer: COMMERCIAL

## 2025-02-18 VITALS
SYSTOLIC BLOOD PRESSURE: 110 MMHG | DIASTOLIC BLOOD PRESSURE: 70 MMHG | WEIGHT: 145 LBS | TEMPERATURE: 97 F | RESPIRATION RATE: 14 BRPM | HEIGHT: 63.5 IN | HEART RATE: 78 BPM | BODY MASS INDEX: 25.37 KG/M2

## 2025-02-18 DIAGNOSIS — Z13.89 SCREENING FOR GENITOURINARY CONDITION: ICD-10-CM

## 2025-02-18 DIAGNOSIS — Z00.00 PHYSICAL EXAM, ANNUAL: ICD-10-CM

## 2025-02-18 DIAGNOSIS — Z12.31 SCREENING MAMMOGRAM FOR BREAST CANCER: ICD-10-CM

## 2025-02-18 DIAGNOSIS — R06.02 SHORTNESS OF BREATH: ICD-10-CM

## 2025-02-18 DIAGNOSIS — Z00.00 LABORATORY EXAMINATION ORDERED AS PART OF A ROUTINE GENERAL MEDICAL EXAMINATION: ICD-10-CM

## 2025-02-18 DIAGNOSIS — Z12.11 SCREEN FOR COLON CANCER: ICD-10-CM

## 2025-02-18 DIAGNOSIS — Z00.00 PHYSICAL EXAM, ANNUAL: Primary | ICD-10-CM

## 2025-02-18 LAB
ALBUMIN SERPL-MCNC: 4.6 G/DL (ref 3.2–4.8)
ALBUMIN/GLOB SERPL: 1.6 {RATIO} (ref 1–2)
ALP LIVER SERPL-CCNC: 37 U/L
ALT SERPL-CCNC: 25 U/L
ANION GAP SERPL CALC-SCNC: 10 MMOL/L (ref 0–18)
AST SERPL-CCNC: 23 U/L (ref ?–34)
BASOPHILS # BLD AUTO: 0.05 X10(3) UL (ref 0–0.2)
BASOPHILS NFR BLD AUTO: 1.3 %
BILIRUB SERPL-MCNC: 0.5 MG/DL (ref 0.3–1.2)
BILIRUB UR QL STRIP.AUTO: NEGATIVE
BUN BLD-MCNC: 13 MG/DL (ref 9–23)
CALCIUM BLD-MCNC: 9.1 MG/DL (ref 8.7–10.6)
CHLORIDE SERPL-SCNC: 97 MMOL/L (ref 98–112)
CHOLEST SERPL-MCNC: 272 MG/DL (ref ?–200)
CLARITY UR REFRACT.AUTO: CLEAR
CO2 SERPL-SCNC: 26 MMOL/L (ref 21–32)
COLOR UR AUTO: YELLOW
CREAT BLD-MCNC: 0.92 MG/DL
EGFRCR SERPLBLD CKD-EPI 2021: 79 ML/MIN/1.73M2 (ref 60–?)
EOSINOPHIL # BLD AUTO: 0.02 X10(3) UL (ref 0–0.7)
EOSINOPHIL NFR BLD AUTO: 0.5 %
ERYTHROCYTE [DISTWIDTH] IN BLOOD BY AUTOMATED COUNT: 13.3 %
FASTING PATIENT LIPID ANSWER: YES
FASTING STATUS PATIENT QL REPORTED: YES
GLOBULIN PLAS-MCNC: 2.8 G/DL (ref 2–3.5)
GLUCOSE BLD-MCNC: 85 MG/DL (ref 70–99)
GLUCOSE UR STRIP.AUTO-MCNC: NORMAL MG/DL
HCT VFR BLD AUTO: 41.5 %
HDLC SERPL-MCNC: 103 MG/DL (ref 40–59)
HGB BLD-MCNC: 13.9 G/DL
IMM GRANULOCYTES # BLD AUTO: 0.01 X10(3) UL (ref 0–1)
IMM GRANULOCYTES NFR BLD: 0.3 %
KETONES UR STRIP.AUTO-MCNC: NEGATIVE MG/DL
LDLC SERPL CALC-MCNC: 160 MG/DL (ref ?–100)
LEUKOCYTE ESTERASE UR QL STRIP.AUTO: NEGATIVE
LYMPHOCYTES # BLD AUTO: 1.64 X10(3) UL (ref 1–4)
LYMPHOCYTES NFR BLD AUTO: 41.2 %
MCH RBC QN AUTO: 29.4 PG (ref 26–34)
MCHC RBC AUTO-ENTMCNC: 33.5 G/DL (ref 31–37)
MCV RBC AUTO: 87.7 FL
MONOCYTES # BLD AUTO: 0.46 X10(3) UL (ref 0.1–1)
MONOCYTES NFR BLD AUTO: 11.6 %
NEUTROPHILS # BLD AUTO: 1.8 X10 (3) UL (ref 1.5–7.7)
NEUTROPHILS # BLD AUTO: 1.8 X10(3) UL (ref 1.5–7.7)
NEUTROPHILS NFR BLD AUTO: 45.1 %
NITRITE UR QL STRIP.AUTO: NEGATIVE
NONHDLC SERPL-MCNC: 169 MG/DL (ref ?–130)
OSMOLALITY SERPL CALC.SUM OF ELEC: 275 MOSM/KG (ref 275–295)
PH UR STRIP.AUTO: 6.5 [PH] (ref 5–8)
PLATELET # BLD AUTO: 239 10(3)UL (ref 150–450)
POTASSIUM SERPL-SCNC: 3.8 MMOL/L (ref 3.5–5.1)
PROT SERPL-MCNC: 7.4 G/DL (ref 5.7–8.2)
PROT UR STRIP.AUTO-MCNC: NEGATIVE MG/DL
RBC # BLD AUTO: 4.73 X10(6)UL
RBC UR QL AUTO: NEGATIVE
SODIUM SERPL-SCNC: 133 MMOL/L (ref 136–145)
SP GR UR STRIP.AUTO: 1.01 (ref 1–1.03)
TRIGL SERPL-MCNC: 62 MG/DL (ref 30–149)
TSI SER-ACNC: 1.37 UIU/ML (ref 0.55–4.78)
UROBILINOGEN UR STRIP.AUTO-MCNC: NORMAL MG/DL
VLDLC SERPL CALC-MCNC: 12 MG/DL (ref 0–30)
WBC # BLD AUTO: 4 X10(3) UL (ref 4–11)

## 2025-02-18 PROCEDURE — 80061 LIPID PANEL: CPT | Performed by: FAMILY MEDICINE

## 2025-02-18 PROCEDURE — 81003 URINALYSIS AUTO W/O SCOPE: CPT | Performed by: FAMILY MEDICINE

## 2025-02-18 PROCEDURE — 80050 GENERAL HEALTH PANEL: CPT | Performed by: FAMILY MEDICINE

## 2025-02-18 RX ORDER — LEVONORGESTREL/ETHIN.ESTRADIOL 0.1-0.02MG
1 TABLET ORAL DAILY
Qty: 84 TABLET | Refills: 3 | Status: SHIPPED | OUTPATIENT
Start: 2025-02-18

## 2025-02-18 RX ORDER — FLUOXETINE HYDROCHLORIDE 40 MG/1
40 CAPSULE ORAL EVERY EVENING
COMMUNITY
Start: 2025-02-10

## 2025-02-18 NOTE — PATIENT INSTRUCTIONS
Healthy diet.  Stay active.   Call [phone] to schedule Mammogram.   Call [phone] to schedule mannitol  challenge test for evaluation of the shortness of breath.  Schedule colonoscopy after your birthday.

## 2025-02-18 NOTE — PROGRESS NOTES
HPI:   Tamiko Reveles is a 44 year old female who presents for a complete physical exam. Symptoms: denies discharge, itching, burning or dysuria. Patient has some episodes of shortness of breath she thinks it is related to anxiety monitor challenge test was not done order placed patient will think if she wants to have it done.  She is seeing psychiatrist prescribed  fluoxetine. .  Doing well with medication.    Exercising regularly.    Immunization History   Administered Date(s) Administered    Covid-19 Vaccine Pfizer 30 mcg/0.3 ml 04/14/2021, 05/14/2021, 01/07/2022    Influenza 10/01/2014, 11/01/2016    TDAP 06/25/2011, 08/17/2022      Wt Readings from Last 6 Encounters:   02/18/25 145 lb (65.8 kg)   10/11/24 (P) 134 lb (60.8 kg)   08/21/24 147 lb (66.7 kg)   12/22/23 140 lb (63.5 kg)   03/22/23 136 lb 6.4 oz (61.9 kg)   08/17/22 131 lb (59.4 kg)     Body mass index is 25.28 kg/m².     Cholesterol, Total (mg/dL)   Date Value   12/22/2023 226 (H)   08/17/2022 216 (H)   05/17/2021 192     CHOLESTEROL, TOTAL (mg/dL)   Date Value   06/25/2011 TNP     CHOLESTEROL (mg/dL)   Date Value   09/16/2013 185     HDL Cholesterol (mg/dL)   Date Value   12/22/2023 104 (H)   08/17/2022 88 (H)   05/17/2021 89 (H)     HDL CHOLESTEROL (mg/dL)   Date Value   06/25/2011 TNP     HDL CHOL (mg/dL)   Date Value   09/16/2013 68     LDL Cholesterol (mg/dL)   Date Value   12/22/2023 116 (H)   08/17/2022 117 (H)   05/17/2021 91     LDL-CHOLESTEROL (mg/dL (calc))   Date Value   06/25/2011 TNP     LDL CHOLESTROL (mg/dL)   Date Value   09/16/2013 101     AST (U/L)   Date Value   08/21/2024 26   12/22/2023 22   08/17/2022 20   09/16/2013 21     ALT (U/L)   Date Value   08/21/2024 23   12/22/2023 26   08/17/2022 26   09/16/2013 26      No results found for: \"GLUCOSE\"     Current Outpatient Medications   Medication Sig Dispense Refill    FLUoxetine HCl 40 MG Oral Cap Take 1 capsule (40 mg total) by mouth every evening.      Levonorgestrel-Ethinyl  Estrad (LESSINA) 0.1-20 MG-MCG Oral Tab Take 1 tablet by mouth daily. 84 tablet 3    FLUoxetine 20 MG Oral Cap Take 2 capsules (40 mg total) by mouth daily.        Past Medical History:    Abdominal pain    Anxiety state, unspecified    Bloating    Depression    Diarrhea, unspecified    Wears glasses      No past surgical history on file.   Family History   Problem Relation Age of Onset    Colon Polyps Mother     Other (anxiety disorder) Mother     Other (hyperthyroidism) Mother     Cancer Father         prostate ca    Prostate Cancer Father     Colon Polyps Father     Cancer Maternal Grandmother         colon ca, to liver     Colon Cancer Maternal Grandmother     Other (sepsis) Maternal Grandfather     Alcohol and Other Disorders Associated Paternal Grandmother       Social History:   Social History     Socioeconomic History    Marital status: Single   Tobacco Use    Smoking status: Never    Smokeless tobacco: Never   Vaping Use    Vaping status: Never Used   Substance and Sexual Activity    Alcohol use: No     Alcohol/week: 0.0 standard drinks of alcohol     Comment: quit 2010    Drug use: No    Sexual activity: Yes     Partners: Male   Other Topics Concern    Caffeine Concern No     Comment: 6 daily diet Dr. 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 Children: none  Exercise: three times per week.  Diet: watches calories closely     REVIEW OF SYSTEMS:   GENERAL:  feels well otherwise  SKIN: denies any unusual skin lesions  EYES:denies blurred vision or double vision  HEENT: denies nasal congestion, sinus pain or ST  LUNGS: denies shortness of breath with exertion  CARDIOVASCULAR: denies chest pain on exertion  GI: denies abdominal pain,denies heartburn  : denies dysuria, vaginal discharge or itching,periods irregular   MUSCULOSKELETAL: denies back pain  NEURO: denies headaches  PSYCHE: denies depression or anxiety  HEMATOLOGIC: denies hx of anemia  ENDOCRINE: denies thyroid history  ALL/ASTHMA: denies hx of allergy or asthma    EXAM:   /70 (BP Location: Left arm, Patient Position: Sitting, Cuff Size: adult)   Pulse 78   Temp 97.2 °F (36.2 °C) (Temporal)   Resp 14   Ht 5' 3.5\" (1.613 m)   Wt 145 lb (65.8 kg)   LMP 02/14/2024   BMI 25.28 kg/m²   Body mass index is 25.28 kg/m².   GENERAL: well developed, well nourished,in no apparent distress  SKIN: no rashes,no suspicious lesions,   HEENT: atraumatic, normocephalic,ears and throat are clear  EYES:PERRLA, EOMI, ,conjunctiva are clear  NECK: supple,no adenopathy  CHEST: no chest tenderness  BREAST: no dominant or suspicious mass multiple nevi  LUNGS: clear to auscultation  CARDIO: RRR without murmur  GI: good BS's,no masses, HSM or tenderness  : Deferred  RECTAL: Deferred  MUSCULOSKELETAL: back is not tender,FROM of the back  EXTREMITIES: no cyanosis, clubbing or edema  NEURO: Oriented times three,cranial nerves are intact,motor and sensory are grossly intact    ASSESSMENT AND PLAN:   Tamiko Reveles is a 44 year old female who presents for a complete physical exam.   Encounter Diagnoses   Name Primary?    Physical exam, annual Yes    Laboratory examination ordered as part of a routine general medical examination     Screening for genitourinary condition     Screening mammogram for breast cancer     Screen for colon cancer     Shortness of breath        Orders Placed This Encounter   Procedures    CBC With  Differential With Platelet    Comp Metabolic Panel (14)    Lipid Panel    Urinalysis with Culture Reflex    TSH W Reflex To Free T4       Meds & Refills for this Visit:  Requested Prescriptions     Signed Prescriptions Disp Refills    Levonorgestrel-Ethinyl Estrad (LESSINA) 0.1-20 MG-MCG Oral Tab 84 tablet 3     Sig: Take 1 tablet by mouth daily.   Healthy diet.  Stay active.   Call [phone] to schedule Mammogram.   Call [phone] to schedule mannitol  challenge test for evaluation of the shortness of breath.  Schedule colonoscopy after your birthday.    Imaging & Consults:  GASTRO - INTERNAL  DEBRA EZEKIEL 2D+3D SCREENING [facility]([id number])   Pap and pelvic done. Order put in for mammogram. Self breast exam explained. Health maintenance, will check fasting Lipids, CMP, and CBC. Pt will be at 45  referred for screening colonoscopy. Pt' s weight is Body mass index is 25.28 kg/m²., recommended low carb diet and aerobic exercise 30 minutes three times weekly.  The patient indicates understanding of these issues and agrees to the plan.  The patient is asked to return for CPX in 1 year.

## 2025-02-19 DIAGNOSIS — E87.1 HYPONATREMIA: Primary | ICD-10-CM

## 2025-03-10 ENCOUNTER — LAB ENCOUNTER (OUTPATIENT)
Dept: LAB | Age: 45
End: 2025-03-10
Attending: FAMILY MEDICINE
Payer: COMMERCIAL

## 2025-03-10 DIAGNOSIS — E87.1 HYPONATREMIA: ICD-10-CM

## 2025-03-10 LAB
ANION GAP SERPL CALC-SCNC: 11 MMOL/L (ref 0–18)
BUN BLD-MCNC: 18 MG/DL (ref 9–23)
CALCIUM BLD-MCNC: 9.1 MG/DL (ref 8.7–10.6)
CHLORIDE SERPL-SCNC: 105 MMOL/L (ref 98–112)
CO2 SERPL-SCNC: 23 MMOL/L (ref 21–32)
CREAT BLD-MCNC: 0.89 MG/DL
EGFRCR SERPLBLD CKD-EPI 2021: 82 ML/MIN/1.73M2 (ref 60–?)
FASTING STATUS PATIENT QL REPORTED: YES
GLUCOSE BLD-MCNC: 86 MG/DL (ref 70–99)
OSMOLALITY SERPL CALC.SUM OF ELEC: 289 MOSM/KG (ref 275–295)
POTASSIUM SERPL-SCNC: 4.6 MMOL/L (ref 3.5–5.1)
SODIUM SERPL-SCNC: 139 MMOL/L (ref 136–145)

## 2025-03-10 PROCEDURE — 80048 BASIC METABOLIC PNL TOTAL CA: CPT | Performed by: FAMILY MEDICINE

## 2025-03-17 ENCOUNTER — HOSPITAL ENCOUNTER (OUTPATIENT)
Dept: MAMMOGRAPHY | Age: 45
Discharge: HOME | End: 2025-03-17
Attending: FAMILY MEDICINE
Payer: COMMERCIAL

## 2025-03-17 DIAGNOSIS — Z12.31 SCREENING MAMMOGRAM FOR BREAST CANCER: ICD-10-CM

## 2025-03-17 PROCEDURE — 77067 SCR MAMMO BI INCL CAD: CPT | Performed by: FAMILY MEDICINE

## 2025-03-17 PROCEDURE — 77063 BREAST TOMOSYNTHESIS BI: CPT | Performed by: FAMILY MEDICINE

## 2025-04-14 ENCOUNTER — HOSPITAL ENCOUNTER (OUTPATIENT)
Dept: MAMMOGRAPHY | Facility: HOSPITAL | Age: 45
Discharge: HOME | End: 2025-04-14
Attending: FAMILY MEDICINE
Payer: COMMERCIAL

## 2025-04-14 DIAGNOSIS — R92.2 INCONCLUSIVE MAMMOGRAM: ICD-10-CM

## 2025-04-14 PROCEDURE — 77061 BREAST TOMOSYNTHESIS UNI: CPT | Performed by: FAMILY MEDICINE

## 2025-04-14 PROCEDURE — 77065 DX MAMMO INCL CAD UNI: CPT | Performed by: FAMILY MEDICINE

## 2025-04-14 PROCEDURE — 76642 ULTRASOUND BREAST LIMITED: CPT | Performed by: FAMILY MEDICINE

## (undated) NOTE — MR AVS SNAPSHOT
[facility], [address] [id number]               Thank you for choosing us for your health care visit with Sky Morales MD.  We are glad to serve you and happy to provide you with this rush [address] [facility], [phone], [address] [facility], [address] [place] [id number]     Phone:  [phone]    - Levonorgestrel-Ethinyl Estrad 0.1-20 MG-MCG Tabs            MyChart     Visit MyChart  You can acce

## (undated) NOTE — LETTER
06/18/21        [address]      Dear [facility],    [address] [facility] records indicate that you have outstanding lab work and or testing that was ordered for you and has not yet been completed:  Orders Placed This Encounter

## (undated) NOTE — MR AVS SNAPSHOT
After Visit Summary   6/10/2020    Vicenta Leon    MRN: [id number]           Visit Information     Date & Time  6/10/2020  1:30 PM Provider  [facility] Joyce MD [facility] [place] [facility], [address] [facility]  Phone  [phone] LIPID PANEL [[id number] CUSTOM]  6/10/2020 (Approximate) 6/10/2021    [facility] EZEKIEL 2D+3D SCREENING BILAT (CPT=77067/84199) [COMBO CPT(R)]  6/10/2020 (Approximate) 6/10/2021    THINPREP PAP SMEAR B [[id number] CUSTOM]  6/10/2020 6/10/2021    TSH W REFLEX TO FREE T4 Average cost  $35*    e-VISTS  Average cost  $35*     SAME DAY APPOINTMENTS   Available at primary care offices    AFTER HOURS CARE  [place]  OFFICE VISIT   Primary Care Providers  Treatment for mild illness or injury that does not require immediate attent